# Patient Record
Sex: FEMALE | Race: WHITE | NOT HISPANIC OR LATINO | Employment: OTHER | URBAN - METROPOLITAN AREA
[De-identification: names, ages, dates, MRNs, and addresses within clinical notes are randomized per-mention and may not be internally consistent; named-entity substitution may affect disease eponyms.]

---

## 2019-11-26 ENCOUNTER — HOSPITAL ENCOUNTER (OUTPATIENT)
Dept: RADIOLOGY | Facility: MEDICAL CENTER | Age: 64
End: 2019-11-26

## 2019-11-26 DIAGNOSIS — I72.9 ANEURYSM (HCC): Primary | ICD-10-CM

## 2019-11-27 NOTE — PROGRESS NOTES
Neuro Interventional Service Consultation      Re: Tamika Appiah     MRN: [id number]   : 1955    Tamika Appiah was referred to our service by Andrew Tatum DO. She is a 64 y.o. female seen in clinic for evaluation and possible aneurysm intervention. She is also under the care of Nai Ocasio MD and Ad Bhat MD.    History of Present Illness:   has a history of dementia and underwent a workup at [facility] for frontotemporal dementia, which has proven negative. Imaging revealed an incidental, unruptured 5-6 mm anterior communicating artery aneurysm. She has been referred to the Neuro Interventional Service for evaluation and management of this finding. Retrospective MRI imaging provided by the patient's  from [facility] shows this aneurysm was present in  but was approximately 1 mm smaller in size.    She is seen today for review of imaging studies and discussion of possible aneurysm intervention. Today, the patient reports her chief complaint is ongoing memory problems. She denies headaches. There is no family history of intracranial aneurysm but her mother had a dissecting aortic aneurysm. Blood pressure is controlled. She does not smoke and has not used recreational drugs in 30 years. She denies any problems with bleeding, including gross hematuria, hematemesis, vaginal bleeding, and melena. She used to get nosebleeds but had cautery and has not had any recent problems. She denies any cardiopulmonary history and denies any past problems with anesthesia. Her  accompanied her and helpfully provided her history and several years of medical records for review.    Past Medical History:   Diagnosis Date   • Dementia 2019     No past surgical history on file.  Social History     Socioeconomic History   • Marital status:      Spouse name: Not on file   • Number of children: Not on file   • Years of education: Not on file   • Highest education level: Not on file   Occupational History   • Not on  file   Social Needs   • Financial resource strain: Not on file   • Food insecurity:     Worry: Not on file     Inability: Not on file   • Transportation needs:     Medical: Not on file     Non-medical: Not on file   Tobacco Use   • Smoking status: Never Smoker   • Smokeless tobacco: Never Used   Substance and Sexual Activity   • Alcohol use: Yes     Comment: rarely   • Drug use: Yes     Comment: marijuana    • Sexual activity: Not on file   Lifestyle   • Physical activity:     Days per week: Not on file     Minutes per session: Not on file   • Stress: Not on file   Relationships   • Social connections:     Talks on phone: Not on file     Gets together: Not on file     Attends L[employer] day service: Not on file     Active member of club or organization: Not on file     Attends meetings of clubs or organizations: Not on file     Relationship status: Not on file   • Intimate partner violence:     Fear of current or ex partner: Not on file     Emotionally abused: Not on file     Physically abused: Not on file     Forced sexual activity: Not on file   Other Topics Concern   • Not on file   Social History Narrative   • Not on file     No family history on file.    Review of Systems   Constitutional: Negative.  Negative for chills, diaphoresis, fever, malaise/fatigue and weight loss.   HENT: Positive for hearing loss. Negative for nosebleeds.    Respiratory: Negative.    Cardiovascular: Negative.    Gastrointestinal: Negative for blood in stool, heartburn and melena.   Genitourinary: Negative for hematuria.   Skin: Negative.    Neurological: Negative for sensory change, speech change, focal weakness, weakness and headaches.   Endo/Heme/Allergies: Does not bruise/bleed easily.   Psychiatric/Behavioral: Positive for memory loss. Negative for substance abuse. The patient is nervous/anxious.      A comprehensive 14-point review of systems was negative except as described above.     Labs:   None    Radiology:   CTA head on October  18, 2019 at [facility]:  1. 4.5 x 5.5 mm aneurysm arising from the anterior communicating artery. Consultation with neurointerventional radiology or neurosurgery is recommended.   2. Stenoses reported are derived by comparing the narrowest segment with the more distal luminal diameter.       MRI May 18, 2015 from [facility]:    Current Outpatient Medications   Medication Sig Dispense Refill   • quetiapine (SEROQUEL) 200 MG Tab Take 200 mg by mouth 2 times a day.     • topiramate (TOPAMAX) 100 MG Tab Take 100 mg by mouth 2 times a day.     • simvastatin (ZOCOR) 10 MG Tab Take 10 mg by mouth every evening.     • non-formulary med Indications: anti depressant, pt unsure of the name     • cyclobenzaprine (FLEXERIL) 10 MG Tab [id number] times a day as needed for spasm 30 Tab 0   • naproxen (NAPROSYN) 500 MG Tab Take 1 Tab by mouth 2 times a day, with meals. 20 Tab 3   • naproxen (NAPROSYN) 500 MG TABS Take 1 Tab by mouth 2 times a day, with meals. 20 Tab 3   • alprazolam (XANAX) 1 MG TABS Take 1 Tab by mouth at bedtime as needed for Sleep. 60 Each 0     No current facility-administered medications for this visit.        No Known Allergies    Physical Exam   Constitutional: She is oriented to person, place, and time and well-developed, well-nourished, and in no distress. No distress.   HENT:   Head: Normocephalic.   Eyes: No scleral icterus.   Pulmonary/Chest: Effort normal. No respiratory distress.   Abdominal: She exhibits no distension.   Neurological: She is alert and oriented to person, place, and time. She has normal sensation and normal strength. She is not agitated and not disoriented. She displays no weakness, no tremor, facial symmetry, normal stance and normal speech. No cranial nerve deficit. Gait normal. Coordination and gait normal.   Skin: Skin is warm and dry. No rash noted. She is not diaphoretic. No erythema. No pallor.   Psychiatric: Affect and judgment normal. Her mood appears anxious. She exhibits  abnormal new learning ability, abnormal recent memory and abnormal remote memory.     Impression:   1. Unruptured anterior communicating artery aneurysm 5-6 mm in size, amenable to endovascular intervention.  2. Dementia.    Plan:   Efrain Dudley MD has reviewed 's history and imaging studies, examined the patient, and discussed treatment options.  is a candidate for endovascular intervention of this incidental asymptomatic aneurysm. We explained that this aneurysm is not contributing to her memory loss and treatment of the aneurysm will not improve her memory and that the goal of intervention is to prevent future rupture. Aneurysms of this size and in this location carry a cumulative 5 year rupture risk of 2-30 %; overall procedure risk is approximately 1-3%. After review of the MRI from 2015, the aneurysm appears to have increased slightly in size. Therefore our recommendation is intervention over surveillance. Our first recommendation is embolization with the Web Device, and if the aneurysm is not amenable to this, then proceed with stent assisted coil embolization.    We discussed the method of the procedure at length including the possibility of the use of stents or balloons to facilitate the procedure and associated risks of those devices. We additionally discussed the procedure risks, including bleeding and infection, damage to the arteries, reaction to any medications given during the procedure, side effects of contrast, radiation exposure, in stent stenosis, coil or stent migration, mechanical failure, stroke, hemorrhage, and death. There is a chance the aneurysm may ultimately not be amenable to endovascular intervention. After the procedure, there is a chance that the aneurysm could recur. We discussed alternatives of the procedure including surveillance and surgical intervention, which she has already discussed with her neurosurgeon. The patient verbalizes understanding of risks,  benefits, and alternatives and elects to proceed. We discussed the need for aspirin and Plavix prior to the procedure and for up to 6 months post procedure if a stent is placed. Side effects of antiplatelet therapy, specifically bleeding, were discussed with instructions given should the patient develop minor or major bleeding. Printed aneurysm education materials including device information were provided. Written pre- and post- procedure care instructions were provided. We discussed signs of a sentinel headache and stroke with instructions to call an ambulance for the onset of these symptoms. We will submit images for review and plan Web placement in January if the device is approved.     JUANITO Jacobsen with Efrain Dudley MD  Neuro Interventional Service   [facility]   [id number]  [place] [address]  [phone]

## 2019-12-05 ENCOUNTER — HOSPITAL ENCOUNTER (OUTPATIENT)
Dept: RADIOLOGY | Facility: MEDICAL CENTER | Age: 64
End: 2019-12-05

## 2019-12-05 ENCOUNTER — HOSPITAL ENCOUNTER (OUTPATIENT)
Dept: RADIOLOGY | Facility: MEDICAL CENTER | Age: 64
End: 2019-12-05
Attending: RADIOLOGY

## 2019-12-05 DIAGNOSIS — I72.9 ANEURYSM (HCC): ICD-10-CM

## 2019-12-05 ASSESSMENT — ENCOUNTER SYMPTOMS
HEARTBURN: 0
HEADACHES: 0
MEMORY LOSS: 1
SENSORY CHANGE: 0
CARDIOVASCULAR NEGATIVE: 1
CONSTITUTIONAL NEGATIVE: 1
NERVOUS/ANXIOUS: 1
BLOOD IN STOOL: 0
WEIGHT LOSS: 0
FOCAL WEAKNESS: 0
RESPIRATORY NEGATIVE: 1
DIAPHORESIS: 0
CHILLS: 0
FEVER: 0
BRUISES/BLEEDS EASILY: 0
WEAKNESS: 0
SPEECH CHANGE: 0

## 2019-12-05 ASSESSMENT — LIFESTYLE VARIABLES: SUBSTANCE_ABUSE: 0

## 2019-12-12 DIAGNOSIS — I72.9 ANEURYSM (HCC): Primary | ICD-10-CM

## 2019-12-12 RX ORDER — SODIUM CHLORIDE 9 MG/ML
INJECTION, SOLUTION INTRAVENOUS CONTINUOUS
Status: CANCELLED | OUTPATIENT
Start: 2019-12-18

## 2019-12-12 NOTE — PROGRESS NOTES
Upon further review of imaging, Dr. Curran determined that additional DSA images were needed to plan for procedure. He spoke with pt/ spouse to explain and they agree to a diagnostic angiogram, which has been scheduled for 12/18.

## 2019-12-17 RX ORDER — SERTRALINE HYDROCHLORIDE 100 MG/1
150 TABLET, FILM COATED ORAL EVERY MORNING
COMMUNITY

## 2019-12-17 RX ORDER — QUETIAPINE 150 MG/1
300 TABLET, FILM COATED, EXTENDED RELEASE ORAL
Status: ON HOLD | COMMUNITY
End: 2020-01-27

## 2019-12-17 RX ORDER — MULTIVITAMIN WITH IRON
250 TABLET ORAL DAILY
COMMUNITY

## 2019-12-17 RX ORDER — ATORVASTATIN CALCIUM 40 MG/1
40 TABLET, FILM COATED ORAL NIGHTLY
COMMUNITY

## 2019-12-17 RX ORDER — ALPRAZOLAM 1 MG/1
1 TABLET ORAL 3 TIMES DAILY PRN
COMMUNITY
End: 2021-05-10

## 2019-12-17 RX ORDER — ROPINIROLE 0.25 MG/1
0.25 TABLET, FILM COATED ORAL
COMMUNITY

## 2019-12-17 RX ORDER — CYANOCOBALAMIN (VITAMIN B-12) 5000 MCG
5000 TABLET,DISINTEGRATING ORAL DAILY
COMMUNITY

## 2019-12-18 ENCOUNTER — APPOINTMENT (OUTPATIENT)
Dept: RADIOLOGY | Facility: MEDICAL CENTER | Age: 64
End: 2019-12-18
Attending: RADIOLOGY
Payer: MEDICARE

## 2019-12-18 ENCOUNTER — HOSPITAL ENCOUNTER (OUTPATIENT)
Facility: MEDICAL CENTER | Age: 64
End: 2019-12-18
Attending: RADIOLOGY | Admitting: RADIOLOGY
Payer: MEDICARE

## 2019-12-18 VITALS
OXYGEN SATURATION: 100 % | BODY MASS INDEX: 20.41 KG/M2 | HEART RATE: 66 BPM | RESPIRATION RATE: 20 BRPM | TEMPERATURE: 97.5 F | SYSTOLIC BLOOD PRESSURE: 108 MMHG | HEIGHT: 68 IN | DIASTOLIC BLOOD PRESSURE: 54 MMHG | WEIGHT: 134.7 LBS

## 2019-12-18 DIAGNOSIS — I72.9 ANEURYSM (HCC): ICD-10-CM

## 2019-12-18 LAB
CREAT SERPL-MCNC: 0.88 MG/DL (ref 0.5–1.4)
ERYTHROCYTE [DISTWIDTH] IN BLOOD BY AUTOMATED COUNT: 45.2 FL (ref 35.9–50)
HCT VFR BLD AUTO: 43.9 % (ref 37–47)
HGB BLD-MCNC: 14.2 G/DL (ref 12–16)
INR PPP: 0.93 (ref 0.87–1.13)
MCH RBC QN AUTO: 30.5 PG (ref 27–33)
MCHC RBC AUTO-ENTMCNC: 32.3 G/DL (ref 33.6–35)
MCV RBC AUTO: 94.2 FL (ref 81.4–97.8)
PLATELET # BLD AUTO: 208 K/UL (ref 164–446)
PMV BLD AUTO: 9.6 FL (ref 9–12.9)
PROTHROMBIN TIME: 12.6 SEC (ref 12–14.6)
RBC # BLD AUTO: 4.66 M/UL (ref 4.2–5.4)
WBC # BLD AUTO: 7.3 K/UL (ref 4.8–10.8)

## 2019-12-18 PROCEDURE — 700111 HCHG RX REV CODE 636 W/ 250 OVERRIDE (IP): Performed by: RADIOLOGY

## 2019-12-18 PROCEDURE — 82565 ASSAY OF CREATININE: CPT

## 2019-12-18 PROCEDURE — 99153 MOD SED SAME PHYS/QHP EA: CPT

## 2019-12-18 PROCEDURE — 700117 HCHG RX CONTRAST REV CODE 255: Performed by: RADIOLOGY

## 2019-12-18 PROCEDURE — 85027 COMPLETE CBC AUTOMATED: CPT

## 2019-12-18 PROCEDURE — 85610 PROTHROMBIN TIME: CPT

## 2019-12-18 PROCEDURE — 700111 HCHG RX REV CODE 636 W/ 250 OVERRIDE (IP)

## 2019-12-18 PROCEDURE — A9270 NON-COVERED ITEM OR SERVICE: HCPCS

## 2019-12-18 PROCEDURE — 700105 HCHG RX REV CODE 258: Performed by: NURSE PRACTITIONER

## 2019-12-18 PROCEDURE — 160002 HCHG RECOVERY MINUTES (STAT)

## 2019-12-18 PROCEDURE — 700102 HCHG RX REV CODE 250 W/ 637 OVERRIDE(OP)

## 2019-12-18 RX ORDER — ONDANSETRON 2 MG/ML
4 INJECTION INTRAMUSCULAR; INTRAVENOUS PRN
Status: DISCONTINUED | OUTPATIENT
Start: 2019-12-18 | End: 2019-12-18 | Stop reason: HOSPADM

## 2019-12-18 RX ORDER — OXYCODONE HYDROCHLORIDE 5 MG/1
TABLET ORAL
Status: COMPLETED
Start: 2019-12-18 | End: 2019-12-18

## 2019-12-18 RX ORDER — SODIUM CHLORIDE 9 MG/ML
INJECTION, SOLUTION INTRAVENOUS CONTINUOUS
Status: DISCONTINUED | OUTPATIENT
Start: 2019-12-18 | End: 2019-12-18 | Stop reason: HOSPADM

## 2019-12-18 RX ORDER — HYDROMORPHONE HYDROCHLORIDE 2 MG/ML
0.25 INJECTION, SOLUTION INTRAMUSCULAR; INTRAVENOUS; SUBCUTANEOUS
Status: DISCONTINUED | OUTPATIENT
Start: 2019-12-18 | End: 2019-12-18 | Stop reason: HOSPADM

## 2019-12-18 RX ORDER — MIDAZOLAM HYDROCHLORIDE 1 MG/ML
.5-2 INJECTION INTRAMUSCULAR; INTRAVENOUS PRN
Status: DISCONTINUED | OUTPATIENT
Start: 2019-12-18 | End: 2019-12-18 | Stop reason: HOSPADM

## 2019-12-18 RX ORDER — OXYCODONE HYDROCHLORIDE 5 MG/1
5 TABLET ORAL
Status: DISCONTINUED | OUTPATIENT
Start: 2019-12-18 | End: 2019-12-18 | Stop reason: HOSPADM

## 2019-12-18 RX ORDER — MIDAZOLAM HYDROCHLORIDE 1 MG/ML
INJECTION INTRAMUSCULAR; INTRAVENOUS
Status: COMPLETED
Start: 2019-12-18 | End: 2019-12-18

## 2019-12-18 RX ORDER — SODIUM CHLORIDE 9 MG/ML
500 INJECTION, SOLUTION INTRAVENOUS
Status: DISCONTINUED | OUTPATIENT
Start: 2019-12-18 | End: 2019-12-18 | Stop reason: HOSPADM

## 2019-12-18 RX ORDER — OXYCODONE HYDROCHLORIDE 5 MG/1
2.5 TABLET ORAL
Status: DISCONTINUED | OUTPATIENT
Start: 2019-12-18 | End: 2019-12-18 | Stop reason: HOSPADM

## 2019-12-18 RX ADMIN — MIDAZOLAM 1 MG: 1 INJECTION INTRAMUSCULAR; INTRAVENOUS at 09:42

## 2019-12-18 RX ADMIN — FENTANYL CITRATE 25 MCG: 50 INJECTION, SOLUTION INTRAMUSCULAR; INTRAVENOUS at 09:39

## 2019-12-18 RX ADMIN — FENTANYL CITRATE 25 MCG: 50 INJECTION, SOLUTION INTRAMUSCULAR; INTRAVENOUS at 09:54

## 2019-12-18 RX ADMIN — FENTANYL CITRATE 25 MCG: 50 INJECTION, SOLUTION INTRAMUSCULAR; INTRAVENOUS at 09:42

## 2019-12-18 RX ADMIN — SODIUM CHLORIDE: 9 INJECTION, SOLUTION INTRAVENOUS at 07:02

## 2019-12-18 RX ADMIN — MIDAZOLAM 1 MG: 1 INJECTION INTRAMUSCULAR; INTRAVENOUS at 09:54

## 2019-12-18 RX ADMIN — OXYCODONE HYDROCHLORIDE 10 MG: 5 TABLET ORAL at 11:47

## 2019-12-18 RX ADMIN — FENTANYL CITRATE 25 MCG: 0.05 INJECTION, SOLUTION INTRAMUSCULAR; INTRAVENOUS at 09:39

## 2019-12-18 RX ADMIN — IOHEXOL 107 ML: 300 INJECTION, SOLUTION INTRAVENOUS at 09:45

## 2019-12-18 RX ADMIN — MIDAZOLAM HYDROCHLORIDE 1 MG: 1 INJECTION, SOLUTION INTRAMUSCULAR; INTRAVENOUS at 09:39

## 2019-12-18 RX ADMIN — FENTANYL CITRATE 25 MCG: 50 INJECTION, SOLUTION INTRAMUSCULAR; INTRAVENOUS at 09:46

## 2019-12-18 RX ADMIN — MIDAZOLAM 1 MG: 1 INJECTION INTRAMUSCULAR; INTRAVENOUS at 09:39

## 2019-12-18 RX ADMIN — MIDAZOLAM 1 MG: 1 INJECTION INTRAMUSCULAR; INTRAVENOUS at 09:46

## 2019-12-18 NOTE — DISCHARGE INSTRUCTIONS
ACTIVITY: Rest and take it easy for the first 24 hours.  A responsible adult is recommended to remain with you during that time.  It is normal to feel sleepy.  We encourage you to not do anything that requires balance, judgment or coordination.    MILD FLU-LIKE SYMPTOMS ARE NORMAL. YOU MAY EXPERIENCE GENERALIZED MUSCLE ACHES, THROAT IRRITATION, HEADACHE AND/OR SOME NAUSEA.    FOR 24 HOURS DO NOT:  Drive, operate machinery or run household appliances.  Drink beer or alcoholic beverages.   Make important decisions or sign legal documents.        DIET: To avoid nausea, slowly advance diet as tolerated, avoiding spicy or greasy foods for the first day.  Add more substantial food to your diet according to your physician's instructions.  Babies can be fed formula or breast milk as soon as they are hungry.  INCREASE FLUIDS AND FIBER TO AVOID CONSTIPATION.    SURGICAL DRESSING/BATHING:     Keep the dressing clean and dry for 24 hours.  May remove dressing tomorrow  and can shower.  Do not submerge site under water for 1 week.  No heavy lifting and limit movement for 5 days.      FOLLOW-UP APPOINTMENT:  A follow-up appointment should be arranged with your doctor; call to schedule.    You should CALL YOUR PHYSICIAN if you develop:  Fever greater than 101 degrees F.  Pain not relieved by medication, or persistent nausea or vomiting.  Excessive bleeding (blood soaking through dressing) or unexpected drainage from the wound.  Extreme redness or swelling around the incision site, drainage of pus or foul smelling drainage.  Inability to urinate or empty your bladder within 8 hours.  Problems with breathing or chest pain.    You should call 911 if you develop problems with breathing or chest pain.  If you are unable to contact your doctor or surgical center, you should go to the nearest emergency room or urgent care center.      Physician's telephone #: [phone]    If any questions arise, call your doctor.  If your doctor is not  available, please feel free to call the Surgical Center at [phone].  The Center is open Monday through Friday from 7AM to 7PM.  You can also call the HEALTH HOTLINE open 24 hours/day, 7 days/week and speak to a nurse at [phone], or toll free at [phone].    A registered nurse may call you a few days after your surgery to see how you are doing after your procedure.    MEDICATIONS: Resume taking daily medication.  Take prescribed pain medication with food.  If no medication is prescribed, you may take non-aspirin pain medication if needed.  PAIN MEDICATION CAN BE VERY CONSTIPATING.  Take a stool softener or laxative such as senokot, pericolace, or milk of magnesia if needed.      If your physician has prescribed pain medication that includes Acetaminophen (Tylenol), do not take additional Acetaminophen (Tylenol) while taking the prescribed medication.    Depression / Suicide Risk    As you are discharged from this [facility] facility, it is important to learn how to keep safe from harming yourself.    Recognize the warning signs:  · Abrupt changes in personality, positive or negative- including increase in energy   · Giving away possessions  · Change in eating patterns- significant weight changes-  positive or negative  · Change in sleeping patterns- unable to sleep or sleeping all the time   · Unwillingness or inability to communicate  · Depression  · Unusual sadness, discouragement and loneliness  · Talk of wanting to die  · Neglect of personal appearance   · Rebelliousness- reckless behavior  · Withdrawal from people/activities they love  · Confusion- inability to concentrate     If you or a loved one observes any of these behaviors or has concerns about self-harm, here's what you can do:  · Talk about it- your feelings and reasons for harming yourself  · Remove any means that you might use to hurt yourself (examples: pills, rope, extension cords, firearm)  · Get professional help from the  "community (Mental Health, Substance Abuse, psychological counseling)  · Do not be alone:Call your Safe Contact- someone whom you trust who will be there for you.  · Call your local CRISIS HOTLINE 816-4703 or 713-197-5209  · Call your local Children's Mobile Crisis Response Team [place] (007) 713-7141 or www.[employer]  · Call the toll free National Suicide Prevention Hotlines   · National Suicide Prevention Lifeline 250-511-OKRV (4906)  · National Hope Line Network 800-SUICIDE (329-3177)                              Post Angiogram Groin Care Instructions     INSTRUCTIONS  2. Examine (look and feel) the site of your incision site TODAY so you can recognize changes that should be called to your doctor (see below).  3. Avoid straining either by lifting or pulling objects for 4-5 days. Avoid lifting over 5 pounds.   4. For at least 72 hours, if you should sneeze or cough, please hold pressure over your groin area.  5. If you should begin to have oozing from the catheterization site, please hold firm pressure and call your doctor's office immediately.  6. If profuse bleeding occurs from the catheterization site, hold firm pressure and call \"[phone]\" immediately for assistance.  7. Remove bandage after 24 hours.     ACTIVITY  2. Limit activity as instructed by your doctor.  3. No driving or very limited driving with frequent stops for one week.   4. If you must take a long car ride, stop every hour and walk around the car.   5. Warm showers or baths are permitted after the bandage is removed. Avoid hot showers, baths, hot tubs, and swimming for one week.    PLEASE CALL YOUR DOCTOR IF:  1. Temperature elevation occurs.  2. Catheterization site becomes reddened or begins to drain.   3. Bruising appears to be new or not resolving. The bruise may move down your leg. This is normal.  4. The small round lump in the groin increases in size.  5. Any leg numbness, aching, or discomfort (immediately).  6. Increasing " discomfort in the leg at the insertion site.  7. Chest pains, even if relieved by Nitroglycerin.    MISCELLANEOUS INSTRUCTIONS  1. Bruising may occur as a result of heart catheterization. Some of the discoloration may travel down the leg, going from blue to green in color.  2. A small round lump under the catheterization site will remain for up to six weeks.  3. If any questions arise call your physician's office. You can also call the [employer] HOTLINE open 24 hours/day, 7 days/week and speak to a nurse at [phone], or toll free at [phone].   4. You should call 911 if you develop problems with breathing or chest pain.      I acknowledge receipt and understanding of these Home Care instructions.

## 2019-12-18 NOTE — PROGRESS NOTES
IR Nursing Note      Procedure Confirmed with MD, RN, RT and patient pre procedure.  Cerebral Angiogram by MD Dudley assisted by RT Raissa, Right Femoral Artery access site.    Sedation Start Time: 0939  Ptime Out/Procedure Start Time: 0940  Procedure Stop Time: 1010  Sedation End Time: 1022    End Tidal CO2 range 20-32 throughout procedure.    Right Femoral Artery access site, sealed with angioseal, covered with gauze/tegaderm, C/D/I.   AngioSeal VIP Vascular Closure Device, REF# [id number], LOT# [id number], exp. Date 5/31/2020.    1+ Bilateral DP pulses pre procedure   1+ Bilateral DP pulses post procedure     Patient tolerated procedure,  hemodynamically stable; pt drowsy but talking post procedure; report given to ANN Weaver.  Patient transported to PPU pod #1 via IR RN monitored then transferred care to report RN.

## 2019-12-18 NOTE — OR SURGEON
Immediate Post- Operative Note        PostOp Diagnosis: aneurym      Procedure(s): diagnostic cerebral angiogram      Estimated Blood Loss: Less than 5 ml        Complications: None            12/18/2019     11:17 AM     Efrain Dudley

## 2019-12-18 NOTE — OR NURSING
1030: Patient from cath lab to PPU via gurney. Patient is awake and on bedrest/flat X 2 hours. VSS. RLE CMS intact. R groin site soft and dressing clean, dry and intact. Vascular access care management per MD order (see assessment). No c/o pain or nausea at this time. Will monitor closely. Vital signs per MD order.   1100: VSS. R groin intact.  at bedside.   1140: Dr. Perez at bedside to update patient and . Patient c/o pain and will medicate.    1215: Patient ambulated to and from the bathroom without a problem. R groin intact.   1220: DC instructions given. Questions answered. Family at bedside. R groin soft,CDI. No c/o pain or nausea. Patient wide awake. VSS. Patient met criteria for discharge.

## 2020-01-27 ENCOUNTER — ANESTHESIA (OUTPATIENT)
Dept: RADIOLOGY | Facility: MEDICAL CENTER | Age: 65
DRG: 027 | End: 2020-01-27
Payer: MEDICARE

## 2020-01-27 ENCOUNTER — HOSPITAL ENCOUNTER (INPATIENT)
Facility: MEDICAL CENTER | Age: 65
LOS: 1 days | DRG: 027 | End: 2020-01-28
Attending: RADIOLOGY | Admitting: RADIOLOGY
Payer: MEDICARE

## 2020-01-27 ENCOUNTER — ANESTHESIA EVENT (OUTPATIENT)
Dept: RADIOLOGY | Facility: MEDICAL CENTER | Age: 65
DRG: 027 | End: 2020-01-27
Payer: MEDICARE

## 2020-01-27 ENCOUNTER — APPOINTMENT (OUTPATIENT)
Dept: RADIOLOGY | Facility: MEDICAL CENTER | Age: 65
DRG: 027 | End: 2020-01-27
Attending: RADIOLOGY
Payer: MEDICARE

## 2020-01-27 DIAGNOSIS — I67.1 INTRACRANIAL ANEURYSM: ICD-10-CM

## 2020-01-27 PROBLEM — N18.30 CHRONIC KIDNEY DISEASE (CKD), STAGE III (MODERATE): Status: ACTIVE | Noted: 2020-01-27

## 2020-01-27 PROBLEM — E78.5 HYPERLIPIDEMIA: Status: ACTIVE | Noted: 2020-01-27

## 2020-01-27 PROBLEM — Z79.899 CHRONIC PRESCRIPTION BENZODIAZEPINE USE: Status: ACTIVE | Noted: 2020-01-27

## 2020-01-27 PROBLEM — B19.20 HEPATITIS C: Status: ACTIVE | Noted: 2020-01-27

## 2020-01-27 PROBLEM — G25.81 RESTLESS LEG SYNDROME: Status: ACTIVE | Noted: 2020-01-27

## 2020-01-27 PROBLEM — F31.9 MANIC DEPRESSIVE ILLNESS (HCC): Status: ACTIVE | Noted: 2020-01-27

## 2020-01-27 PROBLEM — D72.819 LEUKOPENIA: Status: ACTIVE | Noted: 2020-01-27

## 2020-01-27 PROBLEM — F41.9 ANXIETY: Status: ACTIVE | Noted: 2020-01-27

## 2020-01-27 LAB
ACT BLD: 208 SEC (ref 74–137)
ANION GAP SERPL CALC-SCNC: 8 MMOL/L (ref 0–11.9)
BUN SERPL-MCNC: 19 MG/DL (ref 8–22)
CALCIUM SERPL-MCNC: 9.1 MG/DL (ref 8.5–10.5)
CHLORIDE SERPL-SCNC: 112 MMOL/L (ref 96–112)
CO2 SERPL-SCNC: 21 MMOL/L (ref 20–33)
CREAT SERPL-MCNC: 1.11 MG/DL (ref 0.5–1.4)
EKG IMPRESSION: NORMAL
ERYTHROCYTE [DISTWIDTH] IN BLOOD BY AUTOMATED COUNT: 45.7 FL (ref 35.9–50)
GLUCOSE SERPL-MCNC: 97 MG/DL (ref 65–99)
HCT VFR BLD AUTO: 41.4 % (ref 37–47)
HGB BLD-MCNC: 13.7 G/DL (ref 12–16)
INR PPP: 0.93 (ref 0.87–1.13)
MCH RBC QN AUTO: 31 PG (ref 27–33)
MCHC RBC AUTO-ENTMCNC: 33.1 G/DL (ref 33.6–35)
MCV RBC AUTO: 93.7 FL (ref 81.4–97.8)
PLATELET # BLD AUTO: 198 K/UL (ref 164–446)
PMV BLD AUTO: 9.8 FL (ref 9–12.9)
POTASSIUM SERPL-SCNC: 4.3 MMOL/L (ref 3.6–5.5)
PROTHROMBIN TIME: 12.7 SEC (ref 12–14.6)
RBC # BLD AUTO: 4.42 M/UL (ref 4.2–5.4)
SODIUM SERPL-SCNC: 141 MMOL/L (ref 135–145)
WBC # BLD AUTO: 4.3 K/UL (ref 4.8–10.8)

## 2020-01-27 PROCEDURE — 61624 TCAT PERM OCCLS/EMBOLJ CNS: CPT

## 2020-01-27 PROCEDURE — 700111 HCHG RX REV CODE 636 W/ 250 OVERRIDE (IP)

## 2020-01-27 PROCEDURE — 700102 HCHG RX REV CODE 250 W/ 637 OVERRIDE(OP): Performed by: HOSPITALIST

## 2020-01-27 PROCEDURE — 700111 HCHG RX REV CODE 636 W/ 250 OVERRIDE (IP): Performed by: ANESTHESIOLOGY

## 2020-01-27 PROCEDURE — 700117 HCHG RX CONTRAST REV CODE 255: Performed by: RADIOLOGY

## 2020-01-27 PROCEDURE — 85027 COMPLETE CBC AUTOMATED: CPT

## 2020-01-27 PROCEDURE — A9270 NON-COVERED ITEM OR SERVICE: HCPCS | Performed by: HOSPITALIST

## 2020-01-27 PROCEDURE — 700111 HCHG RX REV CODE 636 W/ 250 OVERRIDE (IP): Mod: JG | Performed by: RADIOLOGY

## 2020-01-27 PROCEDURE — 36217 PLACE CATHETER IN ARTERY: CPT

## 2020-01-27 PROCEDURE — 700102 HCHG RX REV CODE 250 W/ 637 OVERRIDE(OP): Performed by: INTERNAL MEDICINE

## 2020-01-27 PROCEDURE — 03VG3DZ RESTRICTION OF INTRACRANIAL ARTERY WITH INTRALUMINAL DEVICE, PERCUTANEOUS APPROACH: ICD-10-PCS | Performed by: RADIOLOGY

## 2020-01-27 PROCEDURE — 80048 BASIC METABOLIC PNL TOTAL CA: CPT

## 2020-01-27 PROCEDURE — A9270 NON-COVERED ITEM OR SERVICE: HCPCS | Performed by: INTERNAL MEDICINE

## 2020-01-27 PROCEDURE — 93005 ELECTROCARDIOGRAM TRACING: CPT | Performed by: RADIOLOGY

## 2020-01-27 PROCEDURE — 700105 HCHG RX REV CODE 258: Performed by: RADIOLOGY

## 2020-01-27 PROCEDURE — 85610 PROTHROMBIN TIME: CPT

## 2020-01-27 PROCEDURE — 99223 1ST HOSP IP/OBS HIGH 75: CPT | Performed by: INTERNAL MEDICINE

## 2020-01-27 PROCEDURE — 770022 HCHG ROOM/CARE - ICU (200)

## 2020-01-27 PROCEDURE — 99223 1ST HOSP IP/OBS HIGH 75: CPT | Performed by: HOSPITALIST

## 2020-01-27 PROCEDURE — 160002 HCHG RECOVERY MINUTES (STAT)

## 2020-01-27 PROCEDURE — B31R1ZZ FLUOROSCOPY OF INTRACRANIAL ARTERIES USING LOW OSMOLAR CONTRAST: ICD-10-PCS | Performed by: RADIOLOGY

## 2020-01-27 PROCEDURE — A9270 NON-COVERED ITEM OR SERVICE: HCPCS | Performed by: RADIOLOGY

## 2020-01-27 PROCEDURE — 700105 HCHG RX REV CODE 258: Performed by: HOSPITALIST

## 2020-01-27 PROCEDURE — 700105 HCHG RX REV CODE 258: Performed by: ANESTHESIOLOGY

## 2020-01-27 PROCEDURE — 700101 HCHG RX REV CODE 250: Performed by: ANESTHESIOLOGY

## 2020-01-27 PROCEDURE — 93010 ELECTROCARDIOGRAM REPORT: CPT | Performed by: INTERNAL MEDICINE

## 2020-01-27 PROCEDURE — 700102 HCHG RX REV CODE 250 W/ 637 OVERRIDE(OP): Performed by: RADIOLOGY

## 2020-01-27 PROCEDURE — 85347 COAGULATION TIME ACTIVATED: CPT

## 2020-01-27 RX ORDER — SODIUM CHLORIDE, SODIUM LACTATE, POTASSIUM CHLORIDE, CALCIUM CHLORIDE 600; 310; 30; 20 MG/100ML; MG/100ML; MG/100ML; MG/100ML
INJECTION, SOLUTION INTRAVENOUS
Status: DISCONTINUED | OUTPATIENT
Start: 2020-01-27 | End: 2020-01-27 | Stop reason: SURG

## 2020-01-27 RX ORDER — LABETALOL HYDROCHLORIDE 5 MG/ML
5-10 INJECTION, SOLUTION INTRAVENOUS EVERY 6 HOURS PRN
Status: DISCONTINUED | OUTPATIENT
Start: 2020-01-27 | End: 2020-01-28 | Stop reason: HOSPADM

## 2020-01-27 RX ORDER — HYDROMORPHONE HYDROCHLORIDE 1 MG/ML
0.1 INJECTION, SOLUTION INTRAMUSCULAR; INTRAVENOUS; SUBCUTANEOUS
Status: DISCONTINUED | OUTPATIENT
Start: 2020-01-27 | End: 2020-01-27 | Stop reason: HOSPADM

## 2020-01-27 RX ORDER — QUETIAPINE FUMARATE 100 MG/1
300 TABLET, FILM COATED ORAL
Status: DISCONTINUED | OUTPATIENT
Start: 2020-01-27 | End: 2020-01-28 | Stop reason: HOSPADM

## 2020-01-27 RX ORDER — EPTIFIBATIDE 0.75 MG/ML
2 INJECTION, SOLUTION INTRAVENOUS CONTINUOUS
Status: DISPENSED | OUTPATIENT
Start: 2020-01-27 | End: 2020-01-27

## 2020-01-27 RX ORDER — ATORVASTATIN CALCIUM 40 MG/1
40 TABLET, FILM COATED ORAL NIGHTLY
Status: DISCONTINUED | OUTPATIENT
Start: 2020-01-27 | End: 2020-01-28 | Stop reason: HOSPADM

## 2020-01-27 RX ORDER — SODIUM CHLORIDE, SODIUM LACTATE, POTASSIUM CHLORIDE, AND CALCIUM CHLORIDE .6; .31; .03; .02 G/100ML; G/100ML; G/100ML; G/100ML
500 INJECTION, SOLUTION INTRAVENOUS
Status: DISCONTINUED | OUTPATIENT
Start: 2020-01-27 | End: 2020-01-27 | Stop reason: HOSPADM

## 2020-01-27 RX ORDER — ASPIRIN 300 MG/1
600 SUPPOSITORY RECTAL ONCE
Status: COMPLETED | OUTPATIENT
Start: 2020-01-27 | End: 2020-01-27

## 2020-01-27 RX ORDER — DEXMEDETOMIDINE HYDROCHLORIDE 100 UG/ML
INJECTION, SOLUTION INTRAVENOUS PRN
Status: DISCONTINUED | OUTPATIENT
Start: 2020-01-27 | End: 2020-01-27 | Stop reason: SURG

## 2020-01-27 RX ORDER — HALOPERIDOL 5 MG/ML
1 INJECTION INTRAMUSCULAR
Status: DISCONTINUED | OUTPATIENT
Start: 2020-01-27 | End: 2020-01-27 | Stop reason: HOSPADM

## 2020-01-27 RX ORDER — OXYCODONE HCL 5 MG/5 ML
5 SOLUTION, ORAL ORAL
Status: DISCONTINUED | OUTPATIENT
Start: 2020-01-27 | End: 2020-01-27 | Stop reason: HOSPADM

## 2020-01-27 RX ORDER — SODIUM CHLORIDE, SODIUM LACTATE, POTASSIUM CHLORIDE, CALCIUM CHLORIDE 600; 310; 30; 20 MG/100ML; MG/100ML; MG/100ML; MG/100ML
INJECTION, SOLUTION INTRAVENOUS CONTINUOUS
Status: ACTIVE | OUTPATIENT
Start: 2020-01-27 | End: 2020-01-27

## 2020-01-27 RX ORDER — VERAPAMIL HYDROCHLORIDE 2.5 MG/ML
20 INJECTION, SOLUTION INTRAVENOUS ONCE
Status: DISCONTINUED | OUTPATIENT
Start: 2020-01-27 | End: 2020-01-28 | Stop reason: HOSPADM

## 2020-01-27 RX ORDER — HEPARIN SODIUM,PORCINE 1000/ML
VIAL (ML) INJECTION PRN
Status: DISCONTINUED | OUTPATIENT
Start: 2020-01-27 | End: 2020-01-27 | Stop reason: SURG

## 2020-01-27 RX ORDER — SODIUM CHLORIDE 9 MG/ML
INJECTION, SOLUTION INTRAVENOUS CONTINUOUS
Status: DISCONTINUED | OUTPATIENT
Start: 2020-01-27 | End: 2020-01-28 | Stop reason: HOSPADM

## 2020-01-27 RX ORDER — VERAPAMIL HYDROCHLORIDE 2.5 MG/ML
INJECTION, SOLUTION INTRAVENOUS
Status: COMPLETED
Start: 2020-01-27 | End: 2020-01-27

## 2020-01-27 RX ORDER — LIDOCAINE HYDROCHLORIDE 10 MG/ML
INJECTION, SOLUTION EPIDURAL; INFILTRATION; INTRACAUDAL; PERINEURAL
Status: COMPLETED
Start: 2020-01-27 | End: 2020-01-27

## 2020-01-27 RX ORDER — HYDROMORPHONE HYDROCHLORIDE 1 MG/ML
0.2 INJECTION, SOLUTION INTRAMUSCULAR; INTRAVENOUS; SUBCUTANEOUS
Status: DISCONTINUED | OUTPATIENT
Start: 2020-01-27 | End: 2020-01-27 | Stop reason: HOSPADM

## 2020-01-27 RX ORDER — POLYETHYLENE GLYCOL 3350 17 G/17G
1 POWDER, FOR SOLUTION ORAL
Status: DISCONTINUED | OUTPATIENT
Start: 2020-01-27 | End: 2020-01-28 | Stop reason: HOSPADM

## 2020-01-27 RX ORDER — OXYCODONE HYDROCHLORIDE 5 MG/1
5 TABLET ORAL EVERY 4 HOURS PRN
Status: DISCONTINUED | OUTPATIENT
Start: 2020-01-27 | End: 2020-01-28 | Stop reason: HOSPADM

## 2020-01-27 RX ORDER — CEFAZOLIN SODIUM 1 G/3ML
INJECTION, POWDER, FOR SOLUTION INTRAMUSCULAR; INTRAVENOUS PRN
Status: DISCONTINUED | OUTPATIENT
Start: 2020-01-27 | End: 2020-01-27 | Stop reason: SURG

## 2020-01-27 RX ORDER — BISACODYL 10 MG
10 SUPPOSITORY, RECTAL RECTAL
Status: DISCONTINUED | OUTPATIENT
Start: 2020-01-27 | End: 2020-01-28 | Stop reason: HOSPADM

## 2020-01-27 RX ORDER — QUETIAPINE FUMARATE 300 MG/1
300 TABLET, FILM COATED ORAL
COMMUNITY

## 2020-01-27 RX ORDER — ACETAMINOPHEN 325 MG/1
650 TABLET ORAL EVERY 6 HOURS PRN
Status: DISCONTINUED | OUTPATIENT
Start: 2020-01-27 | End: 2020-01-28 | Stop reason: HOSPADM

## 2020-01-27 RX ORDER — MIDAZOLAM HYDROCHLORIDE 1 MG/ML
INJECTION INTRAMUSCULAR; INTRAVENOUS
Status: COMPLETED
Start: 2020-01-27 | End: 2020-01-27

## 2020-01-27 RX ORDER — AMOXICILLIN 250 MG
2 CAPSULE ORAL 2 TIMES DAILY
Status: DISCONTINUED | OUTPATIENT
Start: 2020-01-27 | End: 2020-01-28 | Stop reason: HOSPADM

## 2020-01-27 RX ORDER — HALOPERIDOL 5 MG/ML
INJECTION INTRAMUSCULAR
Status: COMPLETED
Start: 2020-01-27 | End: 2020-01-27

## 2020-01-27 RX ORDER — HEPARIN SODIUM,PORCINE 1000/ML
VIAL (ML) INJECTION
Status: COMPLETED
Start: 2020-01-27 | End: 2020-01-27

## 2020-01-27 RX ORDER — LORAZEPAM 2 MG/ML
0.5 INJECTION INTRAMUSCULAR
Status: DISCONTINUED | OUTPATIENT
Start: 2020-01-27 | End: 2020-01-27 | Stop reason: HOSPADM

## 2020-01-27 RX ORDER — ONDANSETRON 2 MG/ML
4 INJECTION INTRAMUSCULAR; INTRAVENOUS
Status: DISCONTINUED | OUTPATIENT
Start: 2020-01-27 | End: 2020-01-27 | Stop reason: HOSPADM

## 2020-01-27 RX ORDER — HYDRALAZINE HYDROCHLORIDE 20 MG/ML
10-20 INJECTION INTRAMUSCULAR; INTRAVENOUS EVERY 6 HOURS PRN
Status: DISCONTINUED | OUTPATIENT
Start: 2020-01-27 | End: 2020-01-28 | Stop reason: HOSPADM

## 2020-01-27 RX ORDER — ONDANSETRON 2 MG/ML
INJECTION INTRAMUSCULAR; INTRAVENOUS PRN
Status: DISCONTINUED | OUTPATIENT
Start: 2020-01-27 | End: 2020-01-27 | Stop reason: SURG

## 2020-01-27 RX ORDER — HALOPERIDOL 5 MG/ML
INJECTION INTRAMUSCULAR PRN
Status: DISCONTINUED | OUTPATIENT
Start: 2020-01-27 | End: 2020-01-27 | Stop reason: SURG

## 2020-01-27 RX ORDER — OXYCODONE HCL 5 MG/5 ML
10 SOLUTION, ORAL ORAL
Status: DISCONTINUED | OUTPATIENT
Start: 2020-01-27 | End: 2020-01-27 | Stop reason: HOSPADM

## 2020-01-27 RX ORDER — EPTIFIBATIDE 2 MG/ML
10 INJECTION, SOLUTION INTRAVENOUS ONCE
Status: DISCONTINUED | OUTPATIENT
Start: 2020-01-27 | End: 2020-01-27

## 2020-01-27 RX ORDER — DEXMEDETOMIDINE HYDROCHLORIDE 100 UG/ML
INJECTION, SOLUTION INTRAVENOUS
Status: COMPLETED
Start: 2020-01-27 | End: 2020-01-27

## 2020-01-27 RX ORDER — ALPRAZOLAM 0.5 MG/1
1 TABLET ORAL 3 TIMES DAILY PRN
Status: DISCONTINUED | OUTPATIENT
Start: 2020-01-27 | End: 2020-01-28 | Stop reason: HOSPADM

## 2020-01-27 RX ORDER — ROPINIROLE 0.25 MG/1
0.25 TABLET, FILM COATED ORAL
Status: DISCONTINUED | OUTPATIENT
Start: 2020-01-27 | End: 2020-01-28 | Stop reason: HOSPADM

## 2020-01-27 RX ADMIN — ROPINIROLE HYDROCHLORIDE 0.25 MG: 0.25 TABLET, FILM COATED ORAL at 20:08

## 2020-01-27 RX ADMIN — HEPARIN SODIUM 1000 UNITS: 1000 INJECTION, SOLUTION INTRAVENOUS; SUBCUTANEOUS at 09:59

## 2020-01-27 RX ADMIN — HALOPERIDOL LACTATE 1 MG: 5 INJECTION, SOLUTION INTRAMUSCULAR at 08:50

## 2020-01-27 RX ADMIN — PHENYLEPHRINE HYDROCHLORIDE 40 MCG/MIN: 10 INJECTION INTRAVENOUS at 08:47

## 2020-01-27 RX ADMIN — SENNOSIDES AND DOCUSATE SODIUM 2 TABLET: 8.6; 5 TABLET ORAL at 17:50

## 2020-01-27 RX ADMIN — OXYCODONE HYDROCHLORIDE 5 MG: 5 TABLET ORAL at 00:00

## 2020-01-27 RX ADMIN — SUFENTANIL CITRATE 25 MCG: 50 INJECTION EPIDURAL; INTRAVENOUS at 08:47

## 2020-01-27 RX ADMIN — LIDOCAINE HYDROCHLORIDE 5 ML: 10 INJECTION, SOLUTION EPIDURAL; INFILTRATION; INTRACAUDAL at 06:45

## 2020-01-27 RX ADMIN — EPTIFIBATIDE 10 MG: 2 INJECTION, SOLUTION INTRAVENOUS at 11:03

## 2020-01-27 RX ADMIN — LIDOCAINE HYDROCHLORIDE 60 MG: 20 INJECTION, SOLUTION INFILTRATION; PERINEURAL at 08:47

## 2020-01-27 RX ADMIN — CEFAZOLIN 2 G: 330 INJECTION, POWDER, FOR SOLUTION INTRAMUSCULAR; INTRAVENOUS at 09:35

## 2020-01-27 RX ADMIN — ROCURONIUM BROMIDE 70 MG: 10 INJECTION, SOLUTION INTRAVENOUS at 08:48

## 2020-01-27 RX ADMIN — ONDANSETRON 4 MG: 2 INJECTION INTRAMUSCULAR; INTRAVENOUS at 11:27

## 2020-01-27 RX ADMIN — TICAGRELOR 180 MG: 90 TABLET ORAL at 20:08

## 2020-01-27 RX ADMIN — PROPOFOL 120 MG: 10 INJECTION, EMULSION INTRAVENOUS at 08:47

## 2020-01-27 RX ADMIN — PROPOFOL 50 MCG/KG/MIN: 10 INJECTION, EMULSION INTRAVENOUS at 08:47

## 2020-01-27 RX ADMIN — QUETIAPINE FUMARATE 300 MG: 100 TABLET ORAL at 20:10

## 2020-01-27 RX ADMIN — ROCURONIUM BROMIDE 20 MG: 10 INJECTION, SOLUTION INTRAVENOUS at 11:09

## 2020-01-27 RX ADMIN — SODIUM CHLORIDE: 9 INJECTION, SOLUTION INTRAVENOUS at 17:50

## 2020-01-27 RX ADMIN — ATORVASTATIN CALCIUM 40 MG: 40 TABLET, FILM COATED ORAL at 20:10

## 2020-01-27 RX ADMIN — SODIUM CHLORIDE, POTASSIUM CHLORIDE, SODIUM LACTATE AND CALCIUM CHLORIDE: 600; 310; 30; 20 INJECTION, SOLUTION INTRAVENOUS at 08:41

## 2020-01-27 RX ADMIN — SUFENTANIL CITRATE 0.2 MCG/KG/HR: 50 INJECTION EPIDURAL; INTRAVENOUS at 08:47

## 2020-01-27 RX ADMIN — ASPIRIN 600 MG: 300 SUPPOSITORY RECTAL at 09:17

## 2020-01-27 RX ADMIN — DEXMEDETOMIDINE HYDROCHLORIDE 20 MCG: 100 INJECTION, SOLUTION INTRAVENOUS at 08:50

## 2020-01-27 RX ADMIN — EPTIFIBATIDE 2 MCG/KG/MIN: 75 INJECTION INTRAVENOUS at 20:47

## 2020-01-27 RX ADMIN — VERAPAMIL HYDROCHLORIDE 20 MG: 2.5 INJECTION, SOLUTION INTRAVENOUS at 09:56

## 2020-01-27 RX ADMIN — HEPARIN SODIUM 1000 UNITS: 1000 INJECTION, SOLUTION INTRAVENOUS; SUBCUTANEOUS at 11:02

## 2020-01-27 RX ADMIN — MIDAZOLAM HYDROCHLORIDE 2 MG: 1 INJECTION, SOLUTION INTRAMUSCULAR; INTRAVENOUS at 08:45

## 2020-01-27 RX ADMIN — SODIUM CHLORIDE, POTASSIUM CHLORIDE, SODIUM LACTATE AND CALCIUM CHLORIDE: 600; 310; 30; 20 INJECTION, SOLUTION INTRAVENOUS at 06:46

## 2020-01-27 RX ADMIN — SODIUM CHLORIDE, POTASSIUM CHLORIDE, SODIUM LACTATE AND CALCIUM CHLORIDE: 600; 310; 30; 20 INJECTION, SOLUTION INTRAVENOUS at 11:08

## 2020-01-27 RX ADMIN — HEPARIN SODIUM 5000 UNITS: 1000 INJECTION, SOLUTION INTRAVENOUS; SUBCUTANEOUS at 09:44

## 2020-01-27 RX ADMIN — IOHEXOL 90 ML: 300 INJECTION, SOLUTION INTRAVENOUS at 11:50

## 2020-01-27 RX ADMIN — SUGAMMADEX 200 MG: 100 INJECTION, SOLUTION INTRAVENOUS at 11:33

## 2020-01-27 RX ADMIN — GLYCOPYRROLATE 0.2 MG: 0.2 INJECTION INTRAMUSCULAR; INTRAVENOUS at 08:50

## 2020-01-27 RX ADMIN — EPTIFIBATIDE 2 MCG/KG/MIN: 75 INJECTION INTRAVENOUS at 11:23

## 2020-01-27 RX ADMIN — SERTRALINE HYDROCHLORIDE 150 MG: 50 TABLET ORAL at 20:10

## 2020-01-27 ASSESSMENT — ENCOUNTER SYMPTOMS
PALPITATIONS: 0
BRUISES/BLEEDS EASILY: 0
DEPRESSION: 0
BLURRED VISION: 0
BLOOD IN STOOL: 0
SPEECH CHANGE: 0
CHILLS: 0
LOSS OF CONSCIOUSNESS: 0
SEIZURES: 0
SORE THROAT: 0
SPUTUM PRODUCTION: 0
BRUISES/BLEEDS EASILY: 1
NAUSEA: 0
VOMITING: 0
FOCAL WEAKNESS: 0
SORE THROAT: 1
BACK PAIN: 0
HEMOPTYSIS: 0
NECK PAIN: 0
HALLUCINATIONS: 0
NERVOUS/ANXIOUS: 0
EYE DISCHARGE: 0
SHORTNESS OF BREATH: 0
COUGH: 0
STRIDOR: 0
EYE PAIN: 0
FEVER: 0
ABDOMINAL PAIN: 0
DOUBLE VISION: 0
MYALGIAS: 0
SENSORY CHANGE: 0
EYE REDNESS: 0
HEADACHES: 0
DIARRHEA: 0
FLANK PAIN: 0
SINUS PAIN: 0

## 2020-01-27 ASSESSMENT — COPD QUESTIONNAIRES
COPD SCREENING SCORE: 2
DO YOU EVER COUGH UP ANY MUCUS OR PHLEGM?: NO/ONLY WITH OCCASIONAL COLDS OR INFECTIONS
HAVE YOU SMOKED AT LEAST 100 CIGARETTES IN YOUR ENTIRE LIFE: NO/DON'T KNOW
DURING THE PAST 4 WEEKS HOW MUCH DID YOU FEEL SHORT OF BREATH: NONE/LITTLE OF THE TIME

## 2020-01-27 ASSESSMENT — LIFESTYLE VARIABLES
EVER_SMOKED: NEVER
SUBSTANCE_ABUSE: 0

## 2020-01-27 ASSESSMENT — PAIN SCALES - GENERAL: PAIN_LEVEL: 1

## 2020-01-27 NOTE — OR SURGEON
Immediate Post- Operative Note        PostOp Diagnosis:ACOM aneurym      Procedure(s): Endovascular repair of ACOM aneurym      Estimated Blood Loss: Less than 5 ml        Complications: None            1/27/2020     11:49 AM     Efrain Dudley M.D.

## 2020-01-27 NOTE — ANESTHESIA PROCEDURE NOTES
Airway  Date/Time: 1/27/2020 8:49 AM  Performed by: Ara Delvalle M.D.  Authorized by: Ara Delvalle M.D.     Location:  OR  Urgency:  Elective  Indications for Airway Management:  Anesthesia  Spontaneous Ventilation: absent    Sedation Level:  Deep  Preoxygenated: Yes    Patient Position:  Sniffing  Mask Difficulty Assessment:  0 - not attempted  Final Airway Type:  Supraglottic airway  Final Supraglottic Airway:  Standard LMA  SGA Size:  4  Cuff Pressure (cm H2O):  40  Airway Seal Pressure (cm H2O):  20  Number of Attempts at Approach:  1   Cuff pressure measured via integrated color-based cuff pressure indicator.

## 2020-01-27 NOTE — ANESTHESIA PREPROCEDURE EVALUATION
"65yo F here for cerebral aneurysm coiling.    No Known Allergies     Relevant Problems   ANESTHESIA (within normal limits)      PULMONARY (within normal limits)      CARDIAC   (+) Intracranial aneurysm      GI (within normal limits)         (+) Hepatitis C (treated)   (-) Renal disease      Other   (+) Chronic prescription benzodiazepine use     Past Medical History:   Diagnosis Date   • Anxiety    • Arthritis     osteo, fingers   • Depression    • Hepatitis C 2000    reprts recieved treatment... \"cured\"   • High cholesterol    • Intracranial aneurysm    • Restless leg syndrome       No current facility-administered medications on file prior to encounter.      Current Outpatient Medications on File Prior to Encounter   Medication Sig Dispense Refill   • quetiapine (SEROQUEL) 300 MG tablet Take 300 mg by mouth every bedtime.     • Ibuprofen-diphenhydrAMINE Cit (ADVIL PM PO) Take 2 Tabs by mouth at bedtime as needed (For sleep and pain).     • sertraline (ZOLOFT) 100 MG Tab Take 150 mg by mouth every morning. Indications: Generalized Anxiety Disorder, Major Depressive Disorder     • atorvastatin (LIPITOR) 40 MG Tab Take 40 mg by mouth every evening.     • ROPINIRole (REQUIP) 0.25 MG Tab Take 0.25 mg by mouth every bedtime. Indications: Restless Leg Syndrome     • ALPRAZolam (XANAX) 1 MG Tab Take 1 mg by mouth 3 times a day as needed for Sleep or Anxiety.     • Calcium-Magnesium-Zinc (CALCIUM-MAGNESUIUM-ZINC PO) Take 1 Tab by mouth every evening.     • Omega-3 Fatty Acids (SALMON OIL-1000 PO) Take 1 Tab by mouth every day.     • Multiple Vitamins-Minerals (MULTIVITAL) Tab Take 1 Tab by mouth every day.     • Coenzyme Q10 (CO Q 10 PO) Take 1 Cap by mouth every day.     • Cholecalciferol (HM VITAMIN D3) 4000 units Cap Take 4,000 Units by mouth every day.     • Magnesium 250 MG Tab Take 250 mg by mouth every day.     • Cyanocobalamin (VITAMIN B-12) 5000 MCG TABLET DISPERSIBLE Take 5,000 mcg by mouth every day.      " "  Past Surgical History:   Procedure Laterality Date   • BOWEL RESECTION  2000    Resection, Bowel   • PLASTIC SURGERY      \"face lift\"      Vitals:    01/27/20 0625   BP: (!) 89/62   Pulse: 71   Resp: 16   Temp: 36.5 °C (97.7 °F)   SpO2: 94%      Physical Exam    Airway   Mallampati: II  TM distance: <3 FB  Neck ROM: limited       Cardiovascular   Rhythm: regular  Rate: normal     Dental - normal exam         Pulmonary   Breath sounds clear to auscultation     Abdominal    Neurological - normal exam                 Anesthesia Plan    ASA 3 (cerebral aneurysm)   ASA physical status 3 criteria: other (comment)    Plan - general       Airway plan will be LMA      Plan Factors:   Patient was not previously instructed to abstain from smoking on day of procedure.  Patient did not smoke on day of procedure.      Induction: intravenous    Postoperative Plan: Postoperative administration of opioids is intended.    Pertinent diagnostic labs and testing reviewed    Informed Consent:    Anesthetic plan and risks discussed with patient.    Use of blood products discussed with: patient whom consented to blood products.         "

## 2020-01-27 NOTE — ANESTHESIA PROCEDURE NOTES
Arterial Line  Performed by: Ara Delvalle M.D.  Authorized by: Ara Delvalle M.D.     Start Time:  1/27/2020 8:55 AM  End Time:  1/27/2020 8:57 AM  Localization: ultrasound guidance  Image captured, interpreted and electronically stored.  Patient Location:  OR  Indication: continuous blood pressure monitoring and blood sampling needed    Catheter Size:  20 G  Seldinger Technique?: Yes    Laterality:  Left  Site:  Radial artery  Line Secured:  Antimicrobial disc, tape and transparent dressing  Events: patient tolerated procedure well with no complications     Direct thread under ultrasound guidance without use of through-and-through technique

## 2020-01-27 NOTE — H&P
"History and Physical    Date: 1/27/2020    PCP: Nai Ocasio M.D.        HPI: This is a 64 y.o. female who is presenting ACOM aneurysm    Past Medical History:   Diagnosis Date   • Anxiety    • Arthritis     osteo, fingers   • Depression    • Hepatitis C 2000    reprts recieved treatment... \"cured\"   • High cholesterol    • Intracranial aneurysm    • Restless leg syndrome        Past Surgical History:   Procedure Laterality Date   • BOWEL RESECTION  2000    Resection, Bowel   • PLASTIC SURGERY      \"face lift\"       Current Facility-Administered Medications   Medication Dose Route Frequency Provider Last Rate Last Dose   • lidocaine (XYLOCAINE) 1 % injection 0.5 mL  0.5 mL Intradermal Once PRN Efrain Dudley M.D.       • lactated ringers infusion   Intravenous Continuous Efrain Dudley M.D. 10 mL/hr at 01/27/20 0646          Social History     Socioeconomic History   • Marital status:      Spouse name: Not on file   • Number of children: Not on file   • Years of education: Not on file   • Highest education level: Not on file   Occupational History   • Not on file   Social Needs   • Financial resource strain: Not on file   • Food insecurity:     Worry: Not on file     Inability: Not on file   • Transportation needs:     Medical: Not on file     Non-medical: Not on file   Tobacco Use   • Smoking status: Never Smoker   • Smokeless tobacco: Never Used   Substance and Sexual Activity   • Alcohol use: Not Currently   • Drug use: Yes     Types: Inhaled     Comment: marijuana  for sleep   • Sexual activity: Not on file   Lifestyle   • Physical activity:     Days per week: Not on file     Minutes per session: Not on file   • Stress: Not on file   Relationships   • Social connections:     Talks on phone: Not on file     Gets together: Not on file     Attends [employer] service: Not on file     Active member of club or organization: Not on file     Attends meetings of clubs or organizations: Not on file     " Relationship status: Not on file   • Intimate partner violence:     Fear of current or ex partner: Not on file     Emotionally abused: Not on file     Physically abused: Not on file     Forced sexual activity: Not on file   Other Topics Concern   • Not on file   Social History Narrative   • Not on file       History reviewed. No pertinent family history.    Allergies:  Patient has no known allergies.    Review of Systems:  ACOm aneurym    Physical Exam   Constitutional: She is oriented to person, place, and time. She appears well-developed and well-nourished.   HENT:   Head: Normocephalic.   Eyes: No scleral icterus.   Pulmonary/Chest: Effort normal. No stridor. No respiratory distress.   Neurological: She is alert and oriented to person, place, and time. No cranial nerve deficit. Coordination normal.   Skin: Skin is dry. No rash noted. No erythema. No pallor.   Psychiatric: She has a normal mood and affect. Her behavior is normal. Judgment and thought content normal.       Vital Signs  Blood Pressure : (!) 89/62   Temperature: 36.5 °C (97.7 °F)   Pulse: 71   Respiration: 16   Pulse Oximetry: 94 %        Labs:  Recent Labs     01/27/20  0650   WBC 4.3*   RBC 4.42   HEMOGLOBIN 13.7   HEMATOCRIT 41.4   MCV 93.7   MCH 31.0   MCHC 33.1*   RDW 45.7   PLATELETCT 198   MPV 9.8     Recent Labs     01/27/20  0650   SODIUM 141   POTASSIUM 4.3   CHLORIDE 112   CO2 21   GLUCOSE 97   BUN 19   CREATININE 1.11   CALCIUM 9.1     Recent Labs     01/27/20  0650   INR 0.93     Recent Labs     01/27/20  0650   INR 0.93       Radiology:  No orders to display             Assessment and Plan:This is a 64 y.o. ACOM aneurysm    Plan:ACOm aneurym endovascualr repair

## 2020-01-27 NOTE — H&P
Hospital Medicine History & Physical Note    Date of Service  1/27/2020    Primary Care Physician  Nai Ocasio M.D.    Consultants  Neuro interventional radiology  Intensive care    Code Status  Full code    Chief Complaint  Headache admitted for elective anterior communicating artery aneurysm stent placement    History of Presenting Illness  64 y.o. female with a past medical history of anxiety, arthritis, dyslipidemia, anterior communicating artery aneurysm and restless leg syndrome who is admitted 1/27/2020 for elective endovascular repair and stent placement of anterior communicating artery aneurysm with Dr. Perez from interventional neuroradiology.  Patient underwent the procedure and tolerated well she did have mild oozing from the catheter insertion site after the procedure.  She also developed some hypotension however was denying dizziness and lightheadedness.  Patient will be admitted to intensive care unit for close monitoring and frequent neuro checks.  She has been continued on antiplatelet infusion therapy with eptifibatide until 10 PM today.  She will then be transitioned to Brilinta and aspirin.  She denies focal weakness or sensory changes.  She denies vision loss and double vision.  She does not have nausea or vomiting    Review of Systems  Review of Systems   Constitutional: Negative for chills and fever.   HENT: Negative for congestion and sore throat.    Eyes: Negative for pain, discharge and redness.   Respiratory: Negative for cough, hemoptysis, sputum production, shortness of breath and stridor.    Cardiovascular: Negative for chest pain, palpitations and leg swelling.   Gastrointestinal: Negative for abdominal pain, blood in stool, diarrhea and vomiting.   Genitourinary: Negative for flank pain, hematuria and urgency.   Musculoskeletal: Negative for myalgias.        Mild oozing from catheter insertion site   Skin: Negative.    Neurological: Negative for speech change, focal weakness,  seizures and loss of consciousness.   Endo/Heme/Allergies: Bruises/bleeds easily.   Psychiatric/Behavioral: Negative for hallucinations and suicidal ideas. The patient is not nervous/anxious.      Past Medical History   has a past medical history of Anxiety, Arthritis, Depression, Hepatitis C (2000), High cholesterol, Intracranial aneurysm, and Restless leg syndrome.    Surgical History   has a past surgical history that includes bowel resection (2000) and plastic surgery.     Family History  family history includes Hypertension in her father.     Social History   reports that she has never smoked. She has never used smokeless tobacco. She reports previous alcohol use. She reports current drug use. Drug: Inhaled.    Allergies  No Known Allergies    Medications  Prior to Admission Medications   Prescriptions Last Dose Informant Patient Reported? Taking?   ALPRAZolam (XANAX) 1 MG Tab 1/27/2020 at 0440 Patient Yes No   Sig: Take 1 mg by mouth 3 times a day as needed for Sleep or Anxiety.   Calcium-Magnesium-Zinc (CALCIUM-MAGNESUIUM-ZINC PO) 1/26/2020 at 1800 Patient Yes No   Sig: Take 1 Tab by mouth every evening.   Cholecalciferol (HM VITAMIN D3) 4000 units Cap 1/26/2020 at 0900 Patient Yes No   Sig: Take 4,000 Units by mouth every day.   Coenzyme Q10 (CO Q 10 PO) 1/26/2020 at 0900 Patient Yes No   Sig: Take 1 Cap by mouth every day.   Cyanocobalamin (VITAMIN B-12) 5000 MCG TABLET DISPERSIBLE 1/26/2020 at 0900 Patient Yes No   Sig: Take 5,000 mcg by mouth every day.   Ibuprofen-diphenhydrAMINE Cit (ADVIL PM PO) >2 nights at PM Patient Yes Yes   Sig: Take 2 Tabs by mouth at bedtime as needed (For sleep and pain).   Magnesium 250 MG Tab 1/26/2020 at 0900 Patient Yes No   Sig: Take 250 mg by mouth every day.   Multiple Vitamins-Minerals (MULTIVITAL) Tab 1/26/2020 at 0900 Patient Yes No   Sig: Take 1 Tab by mouth every day.   Omega-3 Fatty Acids (SALMON OIL-1000 PO) 1/26/2020 at 0900 Patient Yes No   Sig: Take 1 Tab by  mouth every day.   ROPINIRole (REQUIP) 0.25 MG Tab 1/26/2020 at 1900 Patient Yes No   Sig: Take 0.25 mg by mouth every bedtime. Indications: Restless Leg Syndrome   atorvastatin (LIPITOR) 40 MG Tab 1/26/2020 at 1800 Patient Yes No   Sig: Take 40 mg by mouth every evening.   quetiapine (SEROQUEL) 300 MG tablet 1/26/2020 at 1900 Patient Yes Yes   Sig: Take 300 mg by mouth every bedtime.   sertraline (ZOLOFT) 100 MG Tab 1/26/2020 at 1900 Patient Yes No   Sig: Take 150 mg by mouth every morning. Indications: Generalized Anxiety Disorder, Major Depressive Disorder      Facility-Administered Medications: None     Physical Exam  Temp:  [36.5 °C (97.7 °F)-37.2 °C (99 °F)] 36.7 °C (98.1 °F)  Pulse:  [57-83] 77  Resp:  [7-63] 62  BP: ()/(39-62) 112/58  SpO2:  [94 %-100 %] 99 %    Physical Exam  Constitutional:       General: She is not in acute distress.     Appearance: She is not toxic-appearing.   HENT:      Head: Normocephalic and atraumatic.      Right Ear: External ear normal.      Left Ear: External ear normal.      Nose: No congestion or rhinorrhea.      Mouth/Throat:      Mouth: Mucous membranes are moist.      Pharynx: No oropharyngeal exudate or posterior oropharyngeal erythema.   Eyes:      General: No scleral icterus.        Right eye: No discharge.         Left eye: No discharge.      Conjunctiva/sclera: Conjunctivae normal.      Pupils: Pupils are equal, round, and reactive to light.   Neck:      Musculoskeletal: Neck supple. No neck rigidity or muscular tenderness.   Cardiovascular:      Heart sounds: No friction rub. No gallop.    Pulmonary:      Breath sounds: No stridor. No wheezing or rhonchi.   Abdominal:      General: There is no distension.      Palpations: Abdomen is soft.      Tenderness: There is no tenderness. There is no guarding or rebound.   Musculoskeletal:         General: No swelling.      Right lower leg: No edema.      Left lower leg: No edema.      Comments: Clean dressing  Mild oozing  from catheter insertion site.  Sandbag in place   Skin:     Coloration: Skin is pale. Skin is not jaundiced.      Findings: No bruising or erythema.   Neurological:      Mental Status: She is alert and oriented to person, place, and time.      Cranial Nerves: No cranial nerve deficit.      Sensory: No sensory deficit.      Motor: No weakness.   Psychiatric:         Mood and Affect: Mood normal.         Judgment: Judgment normal.       Laboratory:  Recent Labs     01/27/20  0650   WBC 4.3*   RBC 4.42   HEMOGLOBIN 13.7   HEMATOCRIT 41.4   MCV 93.7   MCH 31.0   MCHC 33.1*   RDW 45.7   PLATELETCT 198   MPV 9.8     Recent Labs     01/27/20  0650   SODIUM 141   POTASSIUM 4.3   CHLORIDE 112   CO2 21   GLUCOSE 97   BUN 19   CREATININE 1.11   CALCIUM 9.1     Recent Labs     01/27/20  0650   GLUCOSE 97     Recent Labs     01/27/20  0650   INR 0.93     No results for input(s): NTPROBNP in the last 72 hours.      No results for input(s): TROPONINT in the last 72 hours.    Urinalysis:    No results found     Imaging:  IR-EMBOLIZE-NEURO-INTRACRANIAL    (Results Pending)     Assessment/Plan:    * Intracranial aneurysm  Assessment & Plan  With headaches. Admitted for elective endovascular repair and stent placement of anterior communicating artery aneurysm with Dr. Perez from interventional neuroradiology.  Tolerated the procedure well apart from mild oozing from the catheter insertion site.   She has been continued on antiplatelet infusion therapy with eptifibatide until 10 PM today 1/27.    She will then be transitioned to Brilinta and aspirin.      Chronic kidney disease (CKD), stage III (moderate) ([facility])- (present on admission)  Assessment & Plan  Got contrast with the procedure today  Avoid nephrotoxins as much as possible, renally dose medications, monitor inputs and outputs   We will order a BMP to follow-up on renal function tomorrow      Anxiety  Assessment & Plan  At baseline.  Resume home sertraline and alprazolam.  Watch  vital signs and respiratory status closely.    Hyperlipidemia  Assessment & Plan  Resume home atorvastatin     Manic depressive illness (HCC)  Assessment & Plan  At baseline.  Resume home sertraline    Restless leg syndrome  Assessment & Plan  Resume home ropinirole     VTE prophylaxis: SCDs

## 2020-01-27 NOTE — ANESTHESIA POSTPROCEDURE EVALUATION
Patient: Tamika Appiah    Procedure Summary     Date:  01/27/20 [facility]    Anesthesia Start:  0841 Anesthesia Stop:  1150    Procedure:  IR-EMBOLIZE-NEURO-INTRACRANIAL Diagnosis:       Intracranial aneurysm      Cerebral aneurysm, nonruptured      (ACOMM aneurysm)      (embolization with Web Device)    Scheduled Providers:  Efrain Dudley M.D. Responsible Provider:  Ara Delvalle M.D.    Anesthesia Type:  general ASA Status:  3          Final Anesthesia Type: general  Last vitals  BP   Blood Pressure : (!) 99/49   Temp   37.2 °C (99 °F)    Pulse   Pulse: 61   Resp   12    SpO2   100 %      Anesthesia Post Evaluation    Patient location during evaluation: PACU  Patient participation: complete - patient participated  Level of consciousness: awake and alert  Pain score: 1    Airway patency: patent  Anesthetic complications: no  Cardiovascular status: hemodynamically stable  Respiratory status: acceptable  Hydration status: euvolemic    PONV: none

## 2020-01-27 NOTE — ANESTHESIA TIME REPORT
Anesthesia Start and Stop Event Times     Date Time Event    1/27/2020 0750 Ready for Procedure     0841 Anesthesia Start     1150 Anesthesia Stop        Responsible Staff  01/27/20    Name Role Begin End    Ara Delvalle M.D. Anesth 0841 1150        Preop Diagnosis (Free Text):  Pre-op Diagnosis             Preop Diagnosis (Codes):    Post op Diagnosis  Intracranial aneurysm      Premium Reason  Non-Premium    Comments:

## 2020-01-27 NOTE — PROGRESS NOTES
IR Nurse Procedure Note:    Dr. Dudley and Dr. Delvalle consented patient prior to procedure; all questions answered.    Site  Confirme by MD, RT and RN pre procedure.     Dr. Dudley performed intracranial aneurysm embolization with general anesthesia by DR. Delvalle.  Implant place by Luis Enrique.  All vital signs monitoring and medication administration by anesthesia services. - See anesthesia record.  Right femoral artery access  Arterial line placed by MD Delvalle, [place]  Jacobson catheter place      WEB SL Aneurysm Embolization system  applied to ACOM- Anterior Communicating artery, CDI and soft; pressure held x 0 minutes. AngioSeal 8F deployed at 11:32 Report given to ***RN.  RN transported pt to [facility] with Dr. Delvalle      Implants:   Anterior Communicating artery - ACOM    WEB SL Aneurysm Embolization system   size 6.0mm x 3.0mm X 184 cm   Ref W4-6-3   Lot [id number]    Exp 05-    LVIS jr Intraluminal Support  size 2.5mm X 23 mm X 19mm   Ref [id number]   Lot [id number]   Exp 12-    AngioSeal STS Plus 8F  Ref 234241  Lot [id number]  Exp 08-

## 2020-01-28 VITALS
DIASTOLIC BLOOD PRESSURE: 63 MMHG | HEIGHT: 68 IN | RESPIRATION RATE: 16 BRPM | SYSTOLIC BLOOD PRESSURE: 139 MMHG | HEART RATE: 95 BPM | WEIGHT: 141.54 LBS | BODY MASS INDEX: 21.45 KG/M2 | TEMPERATURE: 98.8 F | OXYGEN SATURATION: 94 %

## 2020-01-28 DIAGNOSIS — I72.9 ANEURYSM (HCC): ICD-10-CM

## 2020-01-28 PROBLEM — D72.819 LEUKOPENIA: Status: RESOLVED | Noted: 2020-01-27 | Resolved: 2020-01-28

## 2020-01-28 LAB
ALBUMIN SERPL BCP-MCNC: 3.1 G/DL (ref 3.2–4.9)
ALBUMIN/GLOB SERPL: 1.3 G/DL
ALP SERPL-CCNC: 50 U/L (ref 30–99)
ALT SERPL-CCNC: 10 U/L (ref 2–50)
ANION GAP SERPL CALC-SCNC: 8 MMOL/L (ref 0–11.9)
AST SERPL-CCNC: 18 U/L (ref 12–45)
BASOPHILS # BLD AUTO: 0.3 % (ref 0–1.8)
BASOPHILS # BLD: 0.02 K/UL (ref 0–0.12)
BILIRUB SERPL-MCNC: 0.2 MG/DL (ref 0.1–1.5)
BUN SERPL-MCNC: 10 MG/DL (ref 8–22)
CALCIUM SERPL-MCNC: 8 MG/DL (ref 8.5–10.5)
CHLORIDE SERPL-SCNC: 108 MMOL/L (ref 96–112)
CO2 SERPL-SCNC: 22 MMOL/L (ref 20–33)
CREAT SERPL-MCNC: 1 MG/DL (ref 0.5–1.4)
EOSINOPHIL # BLD AUTO: 0.06 K/UL (ref 0–0.51)
EOSINOPHIL NFR BLD: 0.8 % (ref 0–6.9)
ERYTHROCYTE [DISTWIDTH] IN BLOOD BY AUTOMATED COUNT: 46 FL (ref 35.9–50)
GLOBULIN SER CALC-MCNC: 2.4 G/DL (ref 1.9–3.5)
GLUCOSE SERPL-MCNC: 107 MG/DL (ref 65–99)
HCT VFR BLD AUTO: 34.1 % (ref 37–47)
HGB BLD-MCNC: 11.3 G/DL (ref 12–16)
IMM GRANULOCYTES # BLD AUTO: 0.03 K/UL (ref 0–0.11)
IMM GRANULOCYTES NFR BLD AUTO: 0.4 % (ref 0–0.9)
LYMPHOCYTES # BLD AUTO: 1.3 K/UL (ref 1–4.8)
LYMPHOCYTES NFR BLD: 17.4 % (ref 22–41)
MAGNESIUM SERPL-MCNC: 1.8 MG/DL (ref 1.5–2.5)
MCH RBC QN AUTO: 31.2 PG (ref 27–33)
MCHC RBC AUTO-ENTMCNC: 33.1 G/DL (ref 33.6–35)
MCV RBC AUTO: 94.2 FL (ref 81.4–97.8)
MONOCYTES # BLD AUTO: 0.55 K/UL (ref 0–0.85)
MONOCYTES NFR BLD AUTO: 7.4 % (ref 0–13.4)
NEUTROPHILS # BLD AUTO: 5.5 K/UL (ref 2–7.15)
NEUTROPHILS NFR BLD: 73.7 % (ref 44–72)
NRBC # BLD AUTO: 0 K/UL
NRBC BLD-RTO: 0 /100 WBC
PLATELET # BLD AUTO: 159 K/UL (ref 164–446)
PMV BLD AUTO: 9.9 FL (ref 9–12.9)
POTASSIUM SERPL-SCNC: 4 MMOL/L (ref 3.6–5.5)
PROT SERPL-MCNC: 5.5 G/DL (ref 6–8.2)
RBC # BLD AUTO: 3.62 M/UL (ref 4.2–5.4)
SODIUM SERPL-SCNC: 138 MMOL/L (ref 135–145)
WBC # BLD AUTO: 7.5 K/UL (ref 4.8–10.8)

## 2020-01-28 PROCEDURE — 83735 ASSAY OF MAGNESIUM: CPT

## 2020-01-28 PROCEDURE — 700102 HCHG RX REV CODE 250 W/ 637 OVERRIDE(OP): Performed by: RADIOLOGY

## 2020-01-28 PROCEDURE — 700105 HCHG RX REV CODE 258: Performed by: HOSPITALIST

## 2020-01-28 PROCEDURE — 700102 HCHG RX REV CODE 250 W/ 637 OVERRIDE(OP): Performed by: HOSPITALIST

## 2020-01-28 PROCEDURE — A9270 NON-COVERED ITEM OR SERVICE: HCPCS | Performed by: INTERNAL MEDICINE

## 2020-01-28 PROCEDURE — A9270 NON-COVERED ITEM OR SERVICE: HCPCS | Performed by: RADIOLOGY

## 2020-01-28 PROCEDURE — 80053 COMPREHEN METABOLIC PANEL: CPT

## 2020-01-28 PROCEDURE — A9270 NON-COVERED ITEM OR SERVICE: HCPCS | Performed by: HOSPITALIST

## 2020-01-28 PROCEDURE — 700102 HCHG RX REV CODE 250 W/ 637 OVERRIDE(OP): Performed by: INTERNAL MEDICINE

## 2020-01-28 PROCEDURE — 99239 HOSP IP/OBS DSCHRG MGMT >30: CPT | Performed by: HOSPITALIST

## 2020-01-28 PROCEDURE — 85025 COMPLETE CBC W/AUTO DIFF WBC: CPT

## 2020-01-28 RX ORDER — ASPIRIN 81 MG/1
81 TABLET ORAL DAILY
Qty: 30 TAB | Refills: 11
Start: 2020-01-29

## 2020-01-28 RX ADMIN — OXYCODONE HYDROCHLORIDE 5 MG: 5 TABLET ORAL at 00:00

## 2020-01-28 RX ADMIN — SENNOSIDES AND DOCUSATE SODIUM 2 TABLET: 8.6; 5 TABLET ORAL at 05:02

## 2020-01-28 RX ADMIN — SODIUM CHLORIDE: 9 INJECTION, SOLUTION INTRAVENOUS at 02:49

## 2020-01-28 RX ADMIN — Medication 400 MG: at 05:02

## 2020-01-28 RX ADMIN — ASPIRIN 81 MG: 81 TABLET, COATED ORAL at 05:02

## 2020-01-28 NOTE — DISCHARGE SUMMARY
Discharge Summary    CHIEF COMPLAINT ON ADMISSION  No chief complaint on file.      Reason for Admission  Cerebral aneurysm, nonruptured     Admission Date  1/27/2020    CODE STATUS  Full Code    HPI & HOSPITAL COURSE  This is a 64 y.o. female here with intracranial aneurysm here for elective aneurysm stenting. Ms. Appiah has a past medical history of hepatitis C and dyslipidemia with a known intracranial aneurysm that underwent a stent of an unruptured anterior communicating artery aneurysm by Dr. Dudley on 1/27/2028 with subsequent ICU admission.  Overnight, she has done well and this morning has no bleeding from the incision site.  Has been able to get up and ambulate does not have any dizziness nor headache.  She is tolerating liquid diet though was not had solids as her throat is little sore but not because of nausea.  Will be discharged today on aspirin 81 mg daily as well as Brilinta was called to the [facility] pharmacy in [place].  Will be on dual antiplatelet therapy and will follow-up with Dr. Dudley in 2 weeks.  He may be a candidate to change Brilinta to Plavix the remainder of her 6-month dual antiplatelet course depending on what she tolerates it and insurance purposes.    Will be discharged home today in stable condition and will return the emergency room for any concerns specifically dizziness, headache, vision changes, neurologic changes.       Therefore, she is discharged in good and stable condition to home with close outpatient follow-up.    The patient recovered much more quickly than anticipated on admission.    Discharge Date  1/28/20    FOLLOW UP ITEMS POST DISCHARGE  Post Angio-Seal instructions specifically no lifting greater than 5 pounds no bath nor swimming or soaking in a tub for 5 days but she may shower.  After 5 days she has regular activities.    DISCHARGE DIAGNOSES  Principal Problem:    Intracranial aneurysm POA: Unknown  Active Problems:    Hyperlipidemia POA: Unknown     Anxiety POA: Unknown    Chronic kidney disease (CKD), stage III (moderate) (HCC) POA: Yes    Chronic prescription benzodiazepine use POA: Unknown    Hepatitis C POA: Unknown    Restless leg syndrome POA: Unknown    Manic depressive illness (HCC) POA: Unknown  Resolved Problems:    Leukopenia POA: Unknown      FOLLOW UP  No future appointments.  Nai Ocasio M.D.  [address] [facility] [place] [id number] [phone]    Schedule an appointment as soon as possible for a visit      Efrain Dudley M.D.  [address] [facility]  [address]  [place] [address]  [phone]    In 2 weeks        MEDICATIONS ON DISCHARGE     Medication List      START taking these medications      Instructions   aspirin 81 MG EC tablet  Start taking on:  January 29, 2020   Take 1 Tab by mouth every day.  Dose:  81 mg     ticagrelor 90 MG Tabs tablet  Commonly known as:  BRILINTA   Take 1 Tab by mouth 2 Times a Day.  Dose:  90 mg        CONTINUE taking these medications      Instructions   ADVIL PM PO   Take 2 Tabs by mouth at bedtime as needed (For sleep and pain).  Dose:  2 Tab     ALPRAZolam 1 MG Tabs  Commonly known as:  XANAX   Take 1 mg by mouth 3 times a day as needed for Sleep or Anxiety.  Dose:  1 mg     atorvastatin 40 MG Tabs  Commonly known as:  LIPITOR   Take 40 mg by mouth every evening.  Dose:  40 mg     CALCIUM-MAGNESUIUM-ZINC PO   Take 1 Tab by mouth every evening.  Dose:  1 Tab     CO Q 10 PO   Take 1 Cap by mouth every day.  Dose:  1 Cap     HM VITAMIN D3 4000 units Caps  Generic drug:  Cholecalciferol   Take 4,000 Units by mouth every day.  Dose:  4,000 Units     Magnesium 250 MG Tabs   Take 250 mg by mouth every day.  Dose:  250 mg     MULTIVITAL Tabs   Take 1 Tab by mouth every day.  Dose:  1 Tab     quetiapine 300 MG tablet  Commonly known as:  SEROQUEL   Take 300 mg by mouth every bedtime.  Dose:  300 mg     REQUIP 0.25 MG Tabs  Generic drug:  ROPINIRole   Take 0.25 mg by mouth every bedtime. Indications: Restless  Leg Syndrome  Dose:  0.25 mg     SALMON OIL-1000 PO   Take 1 Tab by mouth every day.  Dose:  1 Tab     sertraline 100 MG Tabs  Commonly known as:  ZOLOFT   Take 150 mg by mouth every morning. Indications: Generalized Anxiety Disorder, Major Depressive Disorder  Dose:  150 mg     Vitamin B-12 5000 MCG Tbdp   Take 5,000 mcg by mouth every day.  Dose:  5,000 mcg            Allergies  No Known Allergies    DIET  Orders Placed This Encounter   Procedures   • Diet Order Regular, Cardiac     Standing Status:   Standing     Number of Occurrences:   1     Order Specific Question:   Diet:     Answer:   Regular [1]     Order Specific Question:   Diet:     Answer:   Cardiac [6]       ACTIVITY  As above    CONSULTATIONS  Critical care  And eventual radiology    PROCEDURES  Stenting of the unruptured anterior communicating artery aneurysm    LABORATORY  Lab Results   Component Value Date    SODIUM 138 01/28/2020    POTASSIUM 4.0 01/28/2020    CHLORIDE 108 01/28/2020    CO2 22 01/28/2020    GLUCOSE 107 (H) 01/28/2020    BUN 10 01/28/2020    CREATININE 1.00 01/28/2020        Lab Results   Component Value Date    WBC 7.5 01/28/2020    HEMOGLOBIN 11.3 (L) 01/28/2020    HEMATOCRIT 34.1 (L) 01/28/2020    PLATELETCT 159 (L) 01/28/2020      Imaging:  IR-EMBOLIZE-NEURO-INTRACRANIAL    (Results Pending)       Total time of the discharge process exceeds 32 minutes.

## 2020-01-28 NOTE — PROGRESS NOTES
2-RN skin check performed with ANN Sherwood. Noted as follows:   -R groin (Angiosealed) sheath site with dry gauze and tegaderm dressing. Dressing CDI without shadowing. Dressing changed 1/27/20 at 2018.  -Healing (scabbed) abrasions/excoriations to R posterior shoulder and upper back. Pt states this was due to a scrape she sustained at home recently.     No pressure concerns. Preventative Mepilex placed this shift while assessing posterior. Elbows and heels intact.    Devices in place include: Bilateral SCD's, BP cuff, SpO2 finger probe, EKG leads and electrode stickers, Jacobson catheter, PIV x2, arterial line.     Interventions in place include:   -Q2h turns as allowed by pt, especially while sleeping.   -Educated on pathophysiology of aneurysm repair and rationale for frequent groin checks to ensure no bleeding (and associated moisture/breakdown) or hematoma.  -Repositioning and checking beneath ,edcal devices to ensure minimal contact with skin.   -Use of pillows beneath extremities for floating/padding.

## 2020-01-28 NOTE — PROGRESS NOTES
Dr. Dudley not near a computer to enter consult for intensivist.  This RN provided Dr. HASSAN with phone number of Dr. Petty to consult via phone.

## 2020-01-28 NOTE — ASSESSMENT & PLAN NOTE
At baseline.  Resume home sertraline and alprazolam.  Watch vital signs and respiratory status closely.

## 2020-01-28 NOTE — DISCHARGE PLANNING
Pt dc home with spouse. No needs.     Care Transition Team Assessment    Information Source  Orientation : Oriented x 4  Information Given By: Patient  Who is responsible for making decisions for patient? : Patient    Readmission Evaluation  Is this a readmission?: No    Elopement Risk  Legal Hold: No  Ambulatory or Self Mobile in Wheelchair: No-Not an Elopement Risk  Elopement Risk: Not at Risk for Elopement    Discharge Preparedness  What is your plan after discharge?: Home with help  What are your discharge supports?: Spouse  Prior Functional Level: Independent with Activities of Daily Living, Independent with Medication Management    Functional Assesment  Prior Functional Level: Independent with Activities of Daily Living, Independent with Medication Management    Finances  Financial Barriers to Discharge: No  Prescription Coverage: Yes    Vision / Hearing Impairment  Hearing Impairment: (No apparent deficit)    Advance Directive  Advance Directive?: None    Domestic Abuse  Physical Abuse or Sexual Abuse: No  Verbal Abuse or Emotional Abuse: No  Possible Abuse Reported to:: Not Applicable    Psychological Assessment  History of Substance Abuse: None  History of Psychiatric Problems: No  Non-compliant with Treatment: No  Newly Diagnosed Illness: No    Anticipated Discharge Information  Anticipated discharge disposition: Home  Discharge Address: [address] [place] [id number]  Discharge Contact Phone Number: [phone]

## 2020-01-28 NOTE — DISCHARGE INSTRUCTIONS
Discharge Instructions    Discharged to home by car with relative. Discharged via wheelchair, hospital escort: Yes.  Special equipment needed: Not Applicable    Be sure to schedule a follow-up appointment with your primary care doctor or any specialists as instructed.     Discharge Plan:   Diet Plan: Discussed  Activity Level: Discussed  Confirmed Follow up Appointment: Patient to Call and Schedule Appointment  Confirmed Symptoms Management: Discussed  Medication Reconciliation Updated: Yes    I understand that a diet low in cholesterol, fat, and sodium is recommended for good health. Unless I have been given specific instructions below for another diet, I accept this instruction as my diet prescription.   Other diet: Regular    Special Instructions: None    · Is patient discharged on Warfarin / Coumadin?   No     Depression / Suicide Risk    As you are discharged from this [facility] facility, it is important to learn how to keep safe from harming yourself.    Recognize the warning signs:  · Abrupt changes in personality, positive or negative- including increase in energy   · Giving away possessions  · Change in eating patterns- significant weight changes-  positive or negative  · Change in sleeping patterns- unable to sleep or sleeping all the time   · Unwillingness or inability to communicate  · Depression  · Unusual sadness, discouragement and loneliness  · Talk of wanting to die  · Neglect of personal appearance   · Rebelliousness- reckless behavior  · Withdrawal from people/activities they love  · Confusion- inability to concentrate     If you or a loved one observes any of these behaviors or has concerns about self-harm, here's what you can do:  · Talk about it- your feelings and reasons for harming yourself  · Remove any means that you might use to hurt yourself (examples: pills, rope, extension cords, firearm)  · Get professional help from the community (Mental Health, Substance Abuse, psychological  counseling)  · Do not be alone:Call your Safe Contact- someone whom you trust who will be there for you.  · Call your local CRISIS HOTLINE 065-3840 or 246-485-3969  · Call your local Children's Mobile Crisis Response Team [place] (473) 104-8995 or www.OneBuckResume  · Call the toll free National Suicide Prevention Hotlines   · National Suicide Prevention Lifeline 122-797-LJTP (5347)  · [employer] Line Network 800-SUICIDE (995-1722)        Embolization  Embolization is a procedure to block one or more of your blood vessels. This procedure may be done to stop bleeding inside your body or to cut off the blood supply to an abnormal growth of blood vessels or a tumor. Embolization may also be used to treat blood vessels that have become weak and are leaking or in danger of rupturing (aneurysm). A type of medication or synthetic material (embolic agents) that is injected into an artery or vein through a long plastic tube (catheter) is used to stop the flow of blood.   LET YOUR HEALTH CARE PROVIDER KNOW ABOUT  · Any allergies you have.  · All medicines you are taking, including vitamins, herbs, eye drops, creams, and over-the-counter medicines.  · Previous problems you or members of your family have had with the use of anesthetics.  · Any blood disorders you have.  · Previous surgeries you have had.  · Medical conditions you have, especially diabetes or kidney problems.  RISKS AND COMPLICATIONS   Generally, this is a safe procedure. However, as with any procedure, problems can occur. Possible problems include:  · Allergic reaction.  · Infection.  · Swelling, bleeding, or bruising at the puncture site.  · Blood clots.  · Damage to the blood vessel.  · Kidney damage.  BEFORE THE PROCEDURE  Before the procedure, you may have blood tests to make sure your kidneys and liver are working well. These tests can also check to see if your blood is clotting like it should. If you take medicine to keep your blood from  clotting (anticoagulants), ask your health care provider when you should stop taking them.    You may need to stop eating and drinking for 6-8 hours before the procedure. Ask your health care provider.  Make arrangements for someone to take you home. Depending on the procedure, you may be able to go home the same day. However, most people stay overnight in the hospital after this procedure. Ask your health care provider what to expect.  PROCEDURE  Embolization usually takes about 90 minutes. The procedure may vary depending on which part of your body is being treated, but usually the following things will be done:  · You may be given medicine that makes you go to sleep (general anesthetic) or a medicine that numbs only a particular area (local anesthetic). If you are given a local anesthetic, you may also be given medicine to make you relaxed and sleepy (sedative).  · An IV tube will be inserted into your body. Medicine will flow through this tube.  · A needle will be inserted into one of the large blood vessels in your groin (femoral blood vessel).  · A catheter will be inserted into the needle and guided to the area that needs to be treated.  · A dye will be injected through the IV tube and X-rays will be taken. This helps to show the exact location of the blood vessels that are causing the problem.  · The embolic agent will then be injected into the blood vessel.  · More X-rays will be taken to make sure the blood vessel has been closed.  · The catheter will be removed and pressure will be applied to the incision to stop any bleeding.  · A bandage (dressing) will be applied.  AFTER THE PROCEDURE  · You will stay in a recovery area until the anesthesia has worn off. Your blood pressure and pulse will be checked.  · You may also get medication to make you feel comfortable if you have pain, headache, or nausea after the procedure.  · You will need to remain lying down for 6-8 hours. This may mean you have to stay  overnight in the hospital. People usually can leave the hospital within 24 hours after the procedure.  This information is not intended to replace advice given to you by your health care provider. Make sure you discuss any questions you have with your health care provider.  Document Released: 12/23/2014 Document Reviewed: 12/23/2014  [employer] Interactive Patient Education © 2017 [employer]        Discharge Instructions per Rashel Stephenson M.D.    Aspirin and Ticagrelor until you see Dr. Dudley    DIET: healthy    ACTIVITY: as tolerated    DIAGNOSIS: intracranial aneurysm     Return to ER if headache, dizziness, weakness, seizure, vision changes, or any concerns.         Post-angioseal instructions: no lifting greater than 5 lbs and no baths/ swimming/ soaking in tub for 5 days. Shower OK. OK to change dressings to band aid as needed. Resume activity as usual after 5 days.

## 2020-01-28 NOTE — PROGRESS NOTES
Call placed to Dr. De Los Santos regarding new onset pain in patient's L distal arm near site of arterial and IV lines. No apparent infiltration of IV or issues with IV cath site, both flushed and functioning. Patient refusing Tylenol for mild pain, complaining of 7/10 throbbing at site. New orders for Oxycodone 5mg Q4h PRN pain.

## 2020-01-28 NOTE — ASSESSMENT & PLAN NOTE
Got contrast with the procedure today  Avoid nephrotoxins as much as possible, renally dose medications, monitor inputs and outputs   We will order a BMP to follow-up on renal function tomorrow

## 2020-01-28 NOTE — CONSULTS
Critical Care Consultation    Date of consult: 1/27/2020    Referring Physician  Efrain Dudley M.D.    Reason for Consultation  Critical care management post cerebrovascular stenting in interventional radiology    History of Presenting Illness  64 y.o. female with a past medical history of depression, anxiety possibly bipolar, restless leg syndrome and intracranial aneurysm who presented 1/27/2020 for elective aneurysmal stenting by interventional radiology.  Seizure was successfully performed and I believe she was intubated and managed by anesthesia.  Patient was started on Integrilin intravenously by IR.  She will be loaded with oral antiplatelet agents, Brilinta starting tonight as well.  She has a little oozing from her right groin vascular access site and it is currently sandbag with 10 pounds and nurses do not complain of any significant bleeding.  Patient has no pain or neurovascular symptoms in that right lower extremity or right groin.  He did have some labile blood pressure by arterial line noted in the EHR flowsheets but patient is asymptomatic and has normal pressure now.  She denied all neurologic symptoms.    Code Status  Full Code    Review of Systems  Review of Systems   Constitutional: Negative for chills, fever and malaise/fatigue.   HENT: Positive for sore throat (Slight ache postop/post extubation, nothing preop). Negative for congestion and sinus pain.    Eyes: Negative for blurred vision and double vision.   Respiratory: Negative for cough, sputum production and shortness of breath.    Cardiovascular: Negative for chest pain, palpitations and leg swelling.   Gastrointestinal: Negative for abdominal pain, diarrhea, nausea and vomiting.   Genitourinary: Negative for dysuria, flank pain, frequency, hematuria and urgency.   Musculoskeletal: Negative for back pain and neck pain.   Skin: Negative for rash.   Neurological: Negative for sensory change, speech change, focal weakness and headaches.    Endo/Heme/Allergies: Does not bruise/bleed easily.   Psychiatric/Behavioral: Negative for depression (Controlled with medications), substance abuse (Stopped alcohol 23 years ago) and suicidal ideas. The patient is not nervous/anxious.        Past Medical History   has a past medical history of Anxiety, Arthritis, Depression, Hepatitis C (2000), High cholesterol, Intracranial aneurysm, and Restless leg syndrome.    Surgical History   has a past surgical history that includes bowel resection (2000) and plastic surgery.    Family History  family history includes Hypertension in her father.    Social History   reports that she has never smoked. She has never used smokeless tobacco. She reports previous alcohol use. She reports current drug use. Drug: Inhaled.    Medications  Home Medications     Reviewed by Roshni Michel (Pharmacy Tech) on 01/27/20 at 0653  Med List Status: Complete   Medication Last Dose Status   ALPRAZolam (XANAX) 1 MG Tab 1/27/2020 Active   atorvastatin (LIPITOR) 40 MG Tab 1/26/2020 Active   Calcium-Magnesium-Zinc (CALCIUM-MAGNESUIUM-ZINC PO) 1/26/2020 Active   Cholecalciferol (HM VITAMIN D3) 4000 units Cap 1/26/2020 Active   Coenzyme Q10 (CO Q 10 PO) 1/26/2020 Active   Cyanocobalamin (VITAMIN B-12) 5000 MCG TABLET DISPERSIBLE 1/26/2020 Active   Ibuprofen-diphenhydrAMINE Cit (ADVIL PM PO) >2 nights Active   Magnesium 250 MG Tab 1/26/2020 Active   Multiple Vitamins-Minerals (MULTIVITAL) Tab 1/26/2020 Active   Omega-3 Fatty Acids (SALMON OIL-1000 PO) 1/26/2020 Active   quetiapine (SEROQUEL) 300 MG tablet 1/26/2020 Active   ROPINIRole (REQUIP) 0.25 MG Tab 1/26/2020 Active   sertraline (ZOLOFT) 100 MG Tab 1/26/2020 Active              Current Facility-Administered Medications   Medication Dose Route Frequency Provider Last Rate Last Dose   • lactated ringers infusion   Intravenous Continuous Efrain Dudley M.D.   Stopped at 01/27/20 1700   • verapamil (ISOPTIN) injection 20 mg  20 mg  Intravenous Once Efrain Dudley M.D.   Stopped at 01/27/20 1530   • eptifibatide 0.75 mg/mL (INTEGRILIN) infusion  2 mcg/kg/min Intravenous Continuous Efrani Dudley M.D. 9.7 mL/hr at 01/27/20 1123 2 mcg/kg/min at 01/27/20 1123   • [START ON 1/28/2020] aspirin EC (ECOTRIN) tablet 81 mg  81 mg Oral DAILY Efrain Dudley M.D.       • ticagrelor (BRILINTA) tablet 180 mg  180 mg Oral Once Efrain Dudley M.D.       • senna-docusate (PERICOLACE or SENOKOT S) 8.6-50 MG per tablet 2 Tab  2 Tab Oral BID Asem KAREN Paul M.D.   2 Tab at 01/27/20 1750    And   • polyethylene glycol/lytes (MIRALAX) PACKET 1 Packet  1 Packet Oral QDAY PRN Asem KAREN Paul M.D.        And   • magnesium hydroxide (MILK OF MAGNESIA) suspension 30 mL  30 mL Oral QDAY PRN Asem KAREN Paul M.D.        And   • bisacodyl (DULCOLAX) suppository 10 mg  10 mg Rectal QDAY PRN Asem KAREN Paul M.D.       • Respiratory Care per Protocol   Nebulization Continuous RT Asem KAREN Paul M.D.       • NS infusion   Intravenous Continuous Asem KAREN Paul M.D. 83 mL/hr at 01/27/20 1750     • acetaminophen (TYLENOL) tablet 650 mg  650 mg Oral Q6HRS PRN Asem KAREN Paul M.D.       • iohexol (OMNIPAQUE) 300 mg/mL  90 mL Intra-arterial Once Efrain Dudley M.D.       • ALPRAZolam (XANAX) tablet 1 mg  1 mg Oral TID PRN Asesherif Paul M.D.       • atorvastatin (LIPITOR) tablet 40 mg  40 mg Oral Nightly Asem KAREN Paul M.D.       • [START ON 1/28/2020] magnesium oxide (MAG-OX) tablet 400 mg  400 mg Oral DAILY Asem KAREN Paul M.D.       • QUEtiapine (SEROQUEL) tablet 300 mg  300 mg Oral QHS Amanda Paul M.D.       • ROPINIRole (REQUIP) tablet 0.25 mg  0.25 mg Oral QHS Amanda Paul M.D.       • sertraline (ZOLOFT) tablet 150 mg  150 mg Oral DAILY Amanda Paul M.D.       • labetalol (NORMODYNE/TRANDATE) injection 5-10 mg  5-10 mg Intravenous Q6HRS PRN Constantine Lomas M.D.       • hydrALAZINE (APRESOLINE) injection 10-20 mg  10-20 mg  Intravenous Q6HRS PRN Constantine Lomas M.D.           Allergies  No Known Allergies    Vital Signs last 24 hours  Temp:  [36.5 °C (97.7 °F)-37.2 °C (99 °F)] 36.7 °C (98.1 °F)  Pulse:  [57-83] 67  Resp:  [7-63] 14  BP: ()/(39-62) 112/58  SpO2:  [94 %-100 %] 99 %    Physical Exam  Physical Exam  Vitals signs reviewed.   Constitutional:       Appearance: She is not ill-appearing or toxic-appearing.   HENT:      Head: Normocephalic and atraumatic.      Nose: Nose normal.      Mouth/Throat:      Mouth: Mucous membranes are moist.   Eyes:      General: No scleral icterus.     Extraocular Movements: Extraocular movements intact.      Pupils: Pupils are equal, round, and reactive to light.   Neck:      Musculoskeletal: Neck supple. No neck rigidity.      Vascular: No carotid bruit or JVD.   Cardiovascular:      Rate and Rhythm: Normal rate and regular rhythm.  No extrasystoles are present.     Pulses: Normal pulses.      Heart sounds: No murmur.      Comments: Sinus rhythm  Pulmonary:      Effort: Pulmonary effort is normal. No respiratory distress.      Breath sounds: No wheezing, rhonchi or rales.   Abdominal:      General: Abdomen is flat. There is no distension.      Palpations: Abdomen is soft.      Tenderness: There is no tenderness. There is no right CVA tenderness, left CVA tenderness or guarding.   Musculoskeletal:         General: No swelling or tenderness.      Right lower leg: No edema.      Left lower leg: No edema.      Comments: Right groin arterial vascular access site with slight oozing controlled with sandbag, no tenderness, no significant hematoma, pulse strong   Lymphadenopathy:      Cervical: No cervical adenopathy.   Skin:     General: Skin is warm and dry.      Capillary Refill: Capillary refill takes less than 2 seconds.      Coloration: Skin is not cyanotic.      Findings: No lesion or rash.      Nails: There is no clubbing.     Neurological:      General: No focal deficit present.       Mental Status: She is alert and oriented to person, place, and time. Mental status is at baseline.      Cranial Nerves: No cranial nerve deficit.      Sensory: No sensory deficit.      Motor: No weakness.   Psychiatric:         Mood and Affect: Mood normal.         Behavior: Behavior normal. Behavior is cooperative.         Thought Content: Thought content normal.         Fluids    Intake/Output Summary (Last 24 hours) at 1/27/2020 1911  Last data filed at 1/27/2020 1700  Gross per 24 hour   Intake 2212.33 ml   Output 450 ml   Net 1762.33 ml       Laboratory  Recent Results (from the past 48 hour(s))   Prothrombin Time (INR) (plasma)    Collection Time: 01/27/20  6:50 AM   Result Value Ref Range    PT 12.7 12.0 - 14.6 sec    INR 0.93 0.87 - 1.13   Basic Metabolic Panel    Collection Time: 01/27/20  6:50 AM   Result Value Ref Range    Sodium 141 135 - 145 mmol/L    Potassium 4.3 3.6 - 5.5 mmol/L    Chloride 112 96 - 112 mmol/L    Co2 21 20 - 33 mmol/L    Glucose 97 65 - 99 mg/dL    Bun 19 8 - 22 mg/dL    Creatinine 1.11 0.50 - 1.40 mg/dL    Calcium 9.1 8.5 - 10.5 mg/dL    Anion Gap 8.0 0.0 - 11.9   CBC Without Differential    Collection Time: 01/27/20  6:50 AM   Result Value Ref Range    WBC 4.3 (L) 4.8 - 10.8 K/uL    RBC 4.42 4.20 - 5.40 M/uL    Hemoglobin 13.7 12.0 - 16.0 g/dL    Hematocrit 41.4 37.0 - 47.0 %    MCV 93.7 81.4 - 97.8 fL    MCH 31.0 27.0 - 33.0 pg    MCHC 33.1 (L) 33.6 - 35.0 g/dL    RDW 45.7 35.9 - 50.0 fL    Platelet Count 198 164 - 446 K/uL    MPV 9.8 9.0 - 12.9 fL   ESTIMATED GFR    Collection Time: 01/27/20  6:50 AM   Result Value Ref Range    GFR If  59 (A) >60 mL/min/1.73 m 2    GFR If Non African American 49 (A) >60 mL/min/1.73 m 2   ECG    Collection Time: 01/27/20  6:54 AM   Result Value Ref Range    Report       [facility]    Test Date:  2020-01-27  Pt Name:    VALERIE CRUZ                 Department: [facility]  MRN:        [id number]                      Room:        TPREPOOL  Gender:     Female                       Technician: [facility]  :        [id number]                   Requested By:ESTEPHANIA NASHWAJURGEN  Order #:    [id number]                    Reading MD: Fredis Ramirez MD    Measurements  Intervals                                Axis  Rate:       66                           P:          59  SD:         146                          QRS:        54  QRSD:       89                           T:          44  QT:         411  QTc:        431    Interpretive Statements  SINUS RHYTHM  No previous ECG available for comparison  Electronically Signed On 2020 9:11:16 PST by Fredis Ramirez MD     POC ACT (resulted by nursing)    Collection Time: 20  9:54 AM   Result Value Ref Range    Istat Activated Clotting Time 208 (H) 74 - 137 sec       Imaging  IR-EMBOLIZE-NEURO-INTRACRANIAL    (Results Pending)       Assessment/Plan  * Intracranial aneurysm  Assessment & Plan  Status post stenting by IR, clinically doing well without complication  IV Integrilin postop and transitioning to oral agents tonight  Oral Brilinta/ASA  Monitor for bleeding right groin, slight oozing now controlled with sand bagging  Neurochecks every hour initially and reducing in frequency as per IRs protocol  BP control, goal less than 160, as needed IV labetalol/hydralazine-if necessary IV infusion of nicardipine  Low threshold for follow-up CT scan brain, monitoring for the need  Aspiration/seizure precautions    Leukopenia  Assessment & Plan  Mildly low WBC count, no clinical symptoms or history of same, will repeat in a.m.    Anxiety  Assessment & Plan  Clinically doing well now without significant issues of anxiety  Cautious use of benzos if absolutely necessary, patient uses Xanax apparently    Hyperlipidemia  Assessment & Plan  Resume home statin therapy  Diet    Manic depressive illness (HCC)  Assessment & Plan  Controlled with Seroquel and Zoloft.  Patient currently in remission  Resume home  regimen    Restless leg syndrome  Assessment & Plan  Resume home Requip therapy  Monitor for need for additional therapy    Hepatitis C  Assessment & Plan  Managed as an outpatient  No clinical symptoms on review of systems  Check CMP in a.m.    Chronic prescription benzodiazepine use  Assessment & Plan  Monitoring, mood well controlled now, cautious use of benzos if necessary, patient uses Xanax  Managed as an outpatient      Discussed patient condition and risk of morbidity and/or mortality with RN, Patient, hospitalist and Interventional radiology and ICU charge nurse.

## 2020-01-28 NOTE — ASSESSMENT & PLAN NOTE
Monitoring, mood well controlled now, cautious use of benzos if necessary, patient uses Xanax  Managed as an outpatient

## 2020-01-28 NOTE — PROGRESS NOTES
Pt discharged home with . Discharge instructions given. All questions answered at this time. IV catheters removed. Pt able to void post Jacobson removal. VSS. Pt escorted out with RN via wheelchair.

## 2020-01-28 NOTE — PROGRESS NOTES
Call placed to Dr. Dudley to clarify medication order.   Also updated MD that pt has bleeding to R groin site.   Rut replaced and sandbag 10 lb placed.     Currently clarifying which MD to cover pt while in the ICU.

## 2020-01-28 NOTE — ASSESSMENT & PLAN NOTE
Status post stenting by IR, clinically doing well without complication  IV Integrilin postop and transitioning to oral agents tonight  Oral Brilinta/ASA  Monitor for bleeding right groin, slight oozing now controlled with sand bagging  Neurochecks every hour initially and reducing in frequency as per IRs protocol  BP control, goal less than 160, as needed IV labetalol/hydralazine-if necessary IV infusion of nicardipine  Low threshold for follow-up CT scan brain, monitoring for the need  Aspiration/seizure precautions

## 2020-01-28 NOTE — PROGRESS NOTES
Chart check completed.     Bedside neuro exam performed at shift change with outgoing RN without discrepancy barring mild L-sided upper and lower facial droop, which day shift RN did not report noticing since the pt returned from her procedure.     Pt reports having lip injections that are mildly disproportionate, but as stated, upper facial droop with eyebrow raise was also noted.    1958: Page placed to Dr. Dudley to inform. Also updated on remainder of neuro exam which is intact and unchanged from dayshift. No new orders, to call MD again if any other changes are seen overnight. Verified stop order for Integrilin gtt at 2200, and giving Brilinta now. Clarified whether MD would like vascular checks more frequently than Q4h. Orders for Q1h lower vascular checks as long as sandbag is in place.     2010: Sandbag removed for groin dressing change.     2021: Sandbag replaced. Bilat pedal pulses dopplered successfully.

## 2020-01-28 NOTE — ASSESSMENT & PLAN NOTE
With headaches. Admitted for elective endovascular repair and stent placement of anterior communicating artery aneurysm with Dr. Perez from interventional neuroradiology.  Tolerated the procedure well apart from mild oozing from the catheter insertion site.   She has been continued on antiplatelet infusion therapy with eptifibatide until 10 PM today 1/27.    She will then be transitioned to Brilinta and aspirin.

## 2020-01-28 NOTE — PROGRESS NOTES
S/p uncomplicated endovascular neurointervention on 1/27/20 by Efrain Dudley MD (IR, x [phone]) unruptured ACOMM aneurysm treated with WEB device and stent. Admitted to ICU for post procedure neurologic monitoring.     Today, the patient complains of pain at her IV site. Denies headache or vision change. Wants lines out and to go home.  Awake and alert, pleasant. Face symmetric with irregular upper left lip (s/p filler), speech fluent. No drift. Right femoral angio site WNL and nontender. DP/ PT pulses 2+.     From a NeuroInterventional Service standpoint, OK to discharge to home when medically cleared by Hospitalist Service. Patient to follow up in our clinic in 2 weeks, our office to arrange appointment. Does need to continue aspirin and ticagrelor from our standpoint. Calling in 1 month supply to [employer] in [place], will consider switch to clopidogrel at f/u appt for remainder of 6 month dual antiplatelet course.     Post-angioseal instructions: no lifting greater than 5 lbs and no baths/ swimming/ soaking in tub for 5 days. Shower OK. OK to change dressings to band aid as needed. Resume activity as usual after 5 days.

## 2020-01-28 NOTE — CARE PLAN
Problem: Safety  Goal: Will remain free from falls  Note:   Safety interventions in place to prevent falls such as bed locked and in lowest position, Bed alarm on, and call light within reach.      Problem: Infection  Goal: Will remain free from infection  Note:   Universal precautions in place such as hand hygiene

## 2020-01-28 NOTE — CARE PLAN
Problem: Pain Management  Goal: Pain level will decrease to patient's comfort goal  Intervention: Follow pain managment plan developed in collaboration with patient and Interdisciplinary Team  Note:   Patient continually assessed and reassessed for pain at appropriate intervals using scale appropriate for mental status (0-10). Medicated per MAR after updating MD on pain level and location and obtaining orders for Rx.      Problem: Discharge Barriers/Planning  Goal: Patient's continuum of care needs will be met  Intervention: Involve patient and significant other/support system in setting and prioritizing goals for hospital stay and discharge  Note:   Patient assessed for medical knowledge and level of comfort with plan of care and rationale for interventions. Discussed potential barriers to transfer being   Hemodynamic instability/continued or worsened bleeding at groin site   Frequency of neurologic examinations  Potential for changes in neurologic status

## 2020-01-28 NOTE — ASSESSMENT & PLAN NOTE
Clinically doing well now without significant issues of anxiety  Cautious use of benzos if absolutely necessary, patient uses Xanax apparently

## 2020-02-11 ASSESSMENT — LIFESTYLE VARIABLES: SUBSTANCE_ABUSE: 0

## 2020-02-11 ASSESSMENT — ENCOUNTER SYMPTOMS
WEIGHT LOSS: 0
HEADACHES: 0
BLOOD IN STOOL: 0
CHILLS: 0
CONSTITUTIONAL NEGATIVE: 1
FOCAL WEAKNESS: 0
SPEECH CHANGE: 0
SENSORY CHANGE: 0
FEVER: 0
MEMORY LOSS: 1
WEAKNESS: 0
DIAPHORESIS: 0
HEARTBURN: 0

## 2020-02-13 ENCOUNTER — HOSPITAL ENCOUNTER (OUTPATIENT)
Dept: RADIOLOGY | Facility: MEDICAL CENTER | Age: 65
End: 2020-02-13
Attending: NURSE PRACTITIONER
Payer: MEDICARE

## 2020-02-13 DIAGNOSIS — I72.9 ANEURYSM (HCC): ICD-10-CM

## 2020-02-13 ASSESSMENT — ENCOUNTER SYMPTOMS
BRUISES/BLEEDS EASILY: 1
NERVOUS/ANXIOUS: 0

## 2021-05-10 ENCOUNTER — PRE-ADMISSION TESTING (OUTPATIENT)
Dept: ADMISSIONS | Facility: MEDICAL CENTER | Age: 66
End: 2021-05-10
Attending: RADIOLOGY
Payer: MEDICARE

## 2021-05-10 DIAGNOSIS — Z01.812 PRE-OPERATIVE LABORATORY EXAMINATION: ICD-10-CM

## 2021-05-10 RX ORDER — LORAZEPAM 1 MG/1
1 TABLET ORAL EVERY 4 HOURS PRN
COMMUNITY

## 2021-05-21 ENCOUNTER — PRE-ADMISSION TESTING (OUTPATIENT)
Dept: ADMISSIONS | Facility: MEDICAL CENTER | Age: 66
End: 2021-05-21
Attending: RADIOLOGY
Payer: MEDICARE

## 2021-05-21 DIAGNOSIS — Z01.812 PRE-OPERATIVE LABORATORY EXAMINATION: ICD-10-CM

## 2021-05-21 LAB
CREAT SERPL-MCNC: 1 MG/DL (ref 0.5–1.4)
ERYTHROCYTE [DISTWIDTH] IN BLOOD BY AUTOMATED COUNT: 44.9 FL (ref 35.9–50)
HCT VFR BLD AUTO: 41.6 % (ref 37–47)
HGB BLD-MCNC: 13.6 G/DL (ref 12–16)
INR PPP: 0.92 (ref 0.87–1.13)
MCH RBC QN AUTO: 30.4 PG (ref 27–33)
MCHC RBC AUTO-ENTMCNC: 32.7 G/DL (ref 33.6–35)
MCV RBC AUTO: 92.9 FL (ref 81.4–97.8)
PLATELET # BLD AUTO: 283 K/UL (ref 164–446)
PMV BLD AUTO: 10 FL (ref 9–12.9)
PROTHROMBIN TIME: 12.6 SEC (ref 12–14.6)
RBC # BLD AUTO: 4.48 M/UL (ref 4.2–5.4)
WBC # BLD AUTO: 6.3 K/UL (ref 4.8–10.8)

## 2021-05-21 PROCEDURE — 36415 COLL VENOUS BLD VENIPUNCTURE: CPT

## 2021-05-21 PROCEDURE — 85027 COMPLETE CBC AUTOMATED: CPT

## 2021-05-21 PROCEDURE — 85610 PROTHROMBIN TIME: CPT

## 2021-05-21 PROCEDURE — 82565 ASSAY OF CREATININE: CPT

## 2021-05-26 ENCOUNTER — HOSPITAL ENCOUNTER (OUTPATIENT)
Facility: MEDICAL CENTER | Age: 66
End: 2021-05-26
Attending: RADIOLOGY | Admitting: RADIOLOGY
Payer: MEDICARE

## 2021-05-26 ENCOUNTER — APPOINTMENT (OUTPATIENT)
Dept: RADIOLOGY | Facility: MEDICAL CENTER | Age: 66
End: 2021-05-26
Attending: RADIOLOGY
Payer: MEDICARE

## 2021-05-26 VITALS
TEMPERATURE: 97.4 F | BODY MASS INDEX: 20.45 KG/M2 | SYSTOLIC BLOOD PRESSURE: 104 MMHG | DIASTOLIC BLOOD PRESSURE: 65 MMHG | HEIGHT: 68 IN | HEART RATE: 68 BPM | WEIGHT: 134.92 LBS | RESPIRATION RATE: 18 BRPM | OXYGEN SATURATION: 95 %

## 2021-05-26 DIAGNOSIS — I67.1 INTRACRANIAL ANEURYSM: ICD-10-CM

## 2021-05-26 PROCEDURE — 700111 HCHG RX REV CODE 636 W/ 250 OVERRIDE (IP)

## 2021-05-26 PROCEDURE — A9270 NON-COVERED ITEM OR SERVICE: HCPCS | Performed by: RADIOLOGY

## 2021-05-26 PROCEDURE — 700111 HCHG RX REV CODE 636 W/ 250 OVERRIDE (IP): Performed by: RADIOLOGY

## 2021-05-26 PROCEDURE — 99153 MOD SED SAME PHYS/QHP EA: CPT

## 2021-05-26 PROCEDURE — 160002 HCHG RECOVERY MINUTES (STAT)

## 2021-05-26 PROCEDURE — 700117 HCHG RX CONTRAST REV CODE 255: Performed by: RADIOLOGY

## 2021-05-26 PROCEDURE — 700105 HCHG RX REV CODE 258: Performed by: RADIOLOGY

## 2021-05-26 PROCEDURE — 700101 HCHG RX REV CODE 250: Performed by: RADIOLOGY

## 2021-05-26 PROCEDURE — 700102 HCHG RX REV CODE 250 W/ 637 OVERRIDE(OP): Performed by: RADIOLOGY

## 2021-05-26 RX ORDER — HYDROCODONE BITARTRATE AND ACETAMINOPHEN 10; 325 MG/1; MG/1
1 TABLET ORAL 2 TIMES DAILY PRN
COMMUNITY
Start: 2021-05-20

## 2021-05-26 RX ORDER — OXYCODONE HYDROCHLORIDE 5 MG/1
5 TABLET ORAL
Status: DISCONTINUED | OUTPATIENT
Start: 2021-05-26 | End: 2021-05-26 | Stop reason: HOSPADM

## 2021-05-26 RX ORDER — ALPRAZOLAM 1 MG/1
1 TABLET ORAL NIGHTLY PRN
COMMUNITY

## 2021-05-26 RX ORDER — LIDOCAINE HYDROCHLORIDE 10 MG/ML
INJECTION, SOLUTION EPIDURAL; INFILTRATION; INTRACAUDAL; PERINEURAL
Status: DISCONTINUED
Start: 2021-05-26 | End: 2021-05-26 | Stop reason: HOSPADM

## 2021-05-26 RX ORDER — ONDANSETRON 2 MG/ML
4 INJECTION INTRAMUSCULAR; INTRAVENOUS PRN
Status: ACTIVE | OUTPATIENT
Start: 2021-05-26 | End: 2021-05-26

## 2021-05-26 RX ORDER — SODIUM CHLORIDE 9 MG/ML
INJECTION, SOLUTION INTRAVENOUS ONCE
Status: COMPLETED | OUTPATIENT
Start: 2021-05-26 | End: 2021-05-26

## 2021-05-26 RX ORDER — SODIUM CHLORIDE 9 MG/ML
500 INJECTION, SOLUTION INTRAVENOUS
Status: DISPENSED | OUTPATIENT
Start: 2021-05-26 | End: 2021-05-26

## 2021-05-26 RX ORDER — ALPRAZOLAM 0.25 MG/1
1 TABLET ORAL
Status: COMPLETED | OUTPATIENT
Start: 2021-05-26 | End: 2021-05-26

## 2021-05-26 RX ORDER — ONDANSETRON 2 MG/ML
4 INJECTION INTRAMUSCULAR; INTRAVENOUS ONCE
Status: COMPLETED | OUTPATIENT
Start: 2021-05-26 | End: 2021-05-26

## 2021-05-26 RX ORDER — OXYCODONE HYDROCHLORIDE 5 MG/1
2.5 TABLET ORAL
Status: DISCONTINUED | OUTPATIENT
Start: 2021-05-26 | End: 2021-05-26 | Stop reason: HOSPADM

## 2021-05-26 RX ORDER — MIDAZOLAM HYDROCHLORIDE 1 MG/ML
INJECTION INTRAMUSCULAR; INTRAVENOUS
Status: COMPLETED
Start: 2021-05-26 | End: 2021-05-26

## 2021-05-26 RX ORDER — MIDAZOLAM HYDROCHLORIDE 1 MG/ML
.5-2 INJECTION INTRAMUSCULAR; INTRAVENOUS PRN
Status: DISCONTINUED | OUTPATIENT
Start: 2021-05-26 | End: 2021-05-26

## 2021-05-26 RX ORDER — HYDROMORPHONE HYDROCHLORIDE 1 MG/ML
0.25 INJECTION, SOLUTION INTRAMUSCULAR; INTRAVENOUS; SUBCUTANEOUS
Status: DISCONTINUED | OUTPATIENT
Start: 2021-05-26 | End: 2021-05-26 | Stop reason: HOSPADM

## 2021-05-26 RX ORDER — AMOXICILLIN 500 MG/1
500 CAPSULE ORAL 3 TIMES DAILY
COMMUNITY
Start: 2021-05-20

## 2021-05-26 RX ADMIN — ONDANSETRON 4 MG: 2 INJECTION INTRAMUSCULAR; INTRAVENOUS at 12:37

## 2021-05-26 RX ADMIN — FENTANYL CITRATE 25 MCG: 50 INJECTION, SOLUTION INTRAMUSCULAR; INTRAVENOUS at 11:38

## 2021-05-26 RX ADMIN — MIDAZOLAM HYDROCHLORIDE 1 MG: 1 INJECTION, SOLUTION INTRAMUSCULAR; INTRAVENOUS at 11:21

## 2021-05-26 RX ADMIN — FENTANYL CITRATE 25 MCG: 50 INJECTION, SOLUTION INTRAMUSCULAR; INTRAVENOUS at 11:25

## 2021-05-26 RX ADMIN — OXYCODONE HYDROCHLORIDE 5 MG: 5 TABLET ORAL at 12:39

## 2021-05-26 RX ADMIN — IOHEXOL 65 ML: 300 INJECTION, SOLUTION INTRAVENOUS at 12:10

## 2021-05-26 RX ADMIN — LIDOCAINE HYDROCHLORIDE 0.5 ML: 10 INJECTION, SOLUTION EPIDURAL; INFILTRATION; INTRACAUDAL at 10:37

## 2021-05-26 RX ADMIN — MIDAZOLAM HYDROCHLORIDE 1 MG: 1 INJECTION INTRAMUSCULAR; INTRAVENOUS at 11:21

## 2021-05-26 RX ADMIN — ALPRAZOLAM 1 MG: 0.25 TABLET ORAL at 10:35

## 2021-05-26 RX ADMIN — FENTANYL CITRATE 50 MCG: 50 INJECTION, SOLUTION INTRAMUSCULAR; INTRAVENOUS at 11:56

## 2021-05-26 RX ADMIN — SODIUM CHLORIDE 500 ML: 9 INJECTION, SOLUTION INTRAVENOUS at 10:52

## 2021-05-26 ASSESSMENT — PAIN DESCRIPTION - PAIN TYPE
TYPE: ACUTE PAIN;SURGICAL PAIN
TYPE: ACUTE PAIN
TYPE: ACUTE PAIN;SURGICAL PAIN

## 2021-05-26 ASSESSMENT — FIBROSIS 4 INDEX: FIB4 SCORE: 1.35

## 2021-05-26 NOTE — OR SURGEON
Immediate Post- Operative Note        PostOp Diagnosis:S/P endovascular repair of ACOM aneurym      Procedure(s): Diagnostic cerebral angiogram      Estimated Blood Loss: Less than 5 ml        Complications: None            5/26/2021     12:17 PM     Efrain Dudley M.D.

## 2021-05-26 NOTE — OR NURSING
1340 - Received pt from Recovery. No complaints of pain or N/V at this time. VSS. Dressing to R groin CDI and soft to palpate. 2+ pedal pulse felt. No other complaints from patient at this time. Pt to be on strict bed rest until 1430.    1347 - Pt  Bill back with in room with patient.     1438 - Pt off of strict bed rest at this time. Pt ambulated to bathroom and voided adequately. Pt  helping pt get dressed. R groin site still CDI no signs of bleeding    1453 - Discharge orders received. IV taken out. All belongings returned to pt. Pt changed into clothing with assistance. Pt up and ambulated to BR and voided adequately. Discharge instructions given and discussed as well as pain management handout. Pt verbalizes understandings and all questions answered at this time. Pt states they are ready to be D/C home. Pt D/C via wheelchair with all belongings with RN

## 2021-05-26 NOTE — OR NURSING
Assumed care of patient. Alert and oriented times four. Educated patient about plan of care for procedure. See flow sheets for vital signs. Assessment completed. Patient safety measures in place, call light within reach, non skid socks in use, patient educated to call for help before getting up. Hourly rounding in place.     Patient is here in preop for IR procedure. She states that she was assured by two RN's that she would have an order for xanax before her IV placement due to a terrible experience at her last procedure where she was pocked 6 times. This RN contacted IR and spoke with charge ANN Bowman asking for order and she has stated that Dr. Dudley will put the order in. Awaiting medication order to give to patient.    1000: Called IR charge ANN Bowman once again, asking for medication and she stated that she will remind MD to put the order in asap. Updated patient and family of plan.    1030: Received order for xanax. PIV completed with Ultrasound.

## 2021-05-26 NOTE — PROGRESS NOTES
IR Nursing Note:    Patient underwent a diagnostic angiogram by Dr. Dudley. Procedure site was marked by MD and verified using imaging guidance.  Patient was placed in a supine position. Pre-procedure patient was AAOx4, FERRARI, no acute distress. pedal pulse +2.  Right femoral artery was accessed.  Vitals were taken every 5 minutes and remained stable during procedure (see doc flow sheet for results).  CO2 waveform capnography was monitored and remained 30-38 throughout procedure.  An angio-seal was used to achieved hemostasis.  A Tegaderm and gauze dressing was placed over surgical site. Report called to Maribel JUAREZ. Pt transported by emmy with RN to [facility] pacu.       Post procedure patient FERRARI equally, +2 pedal pulse.  Dr. Dudley updated  via phone.    Terumo Angio-seal 6F Ref # [id number] Lot # [id number]

## 2021-05-26 NOTE — H&P
"History and Physical    Date: 5/26/2021    PCP: Nai Ocasio M.D.      HPI: This is a 65 y.o. female who is s/p ACOM aneurym.    Past Medical History:   Diagnosis Date   • Anxiety    • Arthritis     osteo, fingers   • Blood clotting disorder (HCC)     Took Brilinta for 6 months, no longer taking   • Depression    • Hepatitis C 2000    reprts recieved treatment... \"cured\"   • High cholesterol    • Intracranial aneurysm    • Restless leg syndrome        Past Surgical History:   Procedure Laterality Date   • BOWEL RESECTION  2000    Resection, Bowel   • PLASTIC SURGERY      \"face lift\"       Current Facility-Administered Medications   Medication Dose Route Frequency Provider Last Rate Last Admin   • lidocaine (XYLOCAINE) 1 % injection 0.5 mL  0.5 mL Intradermal Once PRN Efrain Dudley M.D.       • NS infusion   Intravenous Once Efrain Dudley M.D.       • LIDOCAINE HCL (PF) 1 % INJ SOLN            • ALPRAZolam (XANAX) tablet 1 mg  1 mg Oral Pre-Op Once Efrain Dudley M.D.            Social History     Socioeconomic History   • Marital status:      Spouse name: Not on file   • Number of children: Not on file   • Years of education: Not on file   • Highest education level: Not on file   Occupational History   • Not on file   Tobacco Use   • Smoking status: Never Smoker   • Smokeless tobacco: Never Used   Vaping Use   • Vaping Use: Never used   Substance and Sexual Activity   • Alcohol use: Not Currently     Comment: Sober 25 years   • Drug use: Yes     Types: Inhaled     Comment: Marijuana  for sleep   • Sexual activity: Not on file   Other Topics Concern   • Not on file   Social History Narrative   • Not on file     Social Determinants of Health     Financial Resource Strain:    • Difficulty of Paying Living Expenses:    Food Insecurity:    • Worried About Running Out of Food in the Last Year:    • Ran Out of Food in the Last Year:    Transportation Needs:    • Lack of Transportation (Medical):  "   • Lack of Transportation (Non-Medical):    Physical Activity:    • Days of Exercise per Week:    • Minutes of Exercise per Session:    Stress:    • Feeling of Stress :    Social Connections:    • Frequency of Communication with Friends and Family:    • Frequency of Social Gatherings with Friends and Family:    • Attends [employer] Services:    • Active Member of Clubs or Organizations:    • Attends Club or Organization Meetings:    • Marital Status:    Intimate Partner Violence:    • Fear of Current or Ex-Partner:    • Emotionally Abused:    • Physically Abused:    • Sexually Abused:        Family History   Problem Relation Age of Onset   • Hypertension Father        Allergies:  Seasonal    Review of Systems:  H/O aneurym    Physical Exam:    Alert and oriented     No gross neuro deficits  Vital Signs  Blood Pressure : 108/67   Temperature: 36.4 °C (97.6 °F)   Pulse: 74   Respiration: 18   Pulse Oximetry: 96 %        Labs:                    Radiology:  IR-CAROTID-CEREBRAL BILATERAL    (Results Pending)             Assessment and Plan:This is a 65 y.o.s/p ACOM repair    Plan:Diagnostic angiogram

## 2021-05-26 NOTE — OR NURSING
Pt on strict bedrest/flat and straight for two (2) hours post procedure.  Two hours will be complete at 1430.  Pt on 0.5 L NC at this time while on bedrest.   Tolerating PO fluids and medication. Right groin site CDI, soft.  Right pedal pulse 2+.  Pain tolerable per pt, 3/10.  VSS, afebrile, FERRARI, A/O x4.    No belongings in PACU.  Transferred with surgical mask in place.   Oxygen tank 75% full.

## 2021-05-26 NOTE — DISCHARGE INSTRUCTIONS
ACTIVITY: Rest and take it easy for the first 24 hours.  A responsible adult is recommended to remain with you during that time.  It is normal to feel sleepy.  We encourage you to not do anything that requires balance, judgment or coordination.    MILD FLU-LIKE SYMPTOMS ARE NORMAL. YOU MAY EXPERIENCE GENERALIZED MUSCLE ACHES, THROAT IRRITATION, HEADACHE AND/OR SOME NAUSEA.    FOR 24 HOURS DO NOT:  Drive, operate machinery or run household appliances.  Drink beer or alcoholic beverages.   Make important decisions or sign legal documents.    SPECIAL INSTRUCTIONS:   Cerebral Angiogram, Care After  This sheet gives you information about how to care for yourself after your procedure. Your health care provider may also give you more specific instructions. If you have problems or questions, contact your health care provider.  What can I expect after the procedure?  After your procedure, it is common to have:  · Bruising and tenderness at the catheter insertion site.  · A mild headache.  Follow these instructions at home:  Insertion site care    · Follow instructions from your health care provider about how to take care of the insertion site. Make sure you:  ? Wash your hands with soap and water before you change your bandage (dressing). If soap and water are not available, use hand .  ? Change your dressing as told by your health care provider.  ? Leave stitches (sutures), skin glue, or adhesive strips in place. These skin closures may need to stay in place for 2 weeks or longer. If adhesive strip edges start to loosen and curl up, you may trim the loose edges. Do not remove adhesive strips completely unless your health care provider tells you to do that.  · Do not apply powder or lotion to the site.  · Do not take baths, swim, or use a hot tub until your health care provider says it is okay to do so.  · You may shower 24-48 hours after the procedure or as told by your health care provider.  ? Gently wash the  site with plain soap and water.  ? Pat the area dry with a clean towel.  ? Do not rub the site. This may cause bleeding.  · Check your incision area every day for signs of infection. Check for:  ? Redness, swelling, or pain.  ? Fluid or blood.  ? Warmth.  ? Pus or a bad smell.  Activity  · Rest as told by your health care provider.  · Do not lift anything that is heavier than 10 lb (4.5 kg), or the limit that your health care provider tells you, until he or she says that it is safe.  · Do not drive for 24 hours if you were given a medicine to help you relax (sedative).  · Do not drive or use heavy machinery while taking prescription pain medicine.  General instructions  · Return to your normal activities as told by your health care provider. Ask your health care provider what activities are safe for you.  · If the catheter site starts to bleed, lie flat and put pressure on the site.  · Drink enough fluid to keep your urine clear or pale yellow. This helps flush the contrast dye from your body.  · Take over-the-counter and prescription medicines only as told by your health care provider.  · Keep all follow-up visits as directed by your health care provider. This is important.  Contact a health care provider if:  · You have a fever or chills.  · You have redness, swelling, or more pain around your insertion site.  · You have fluid or blood coming from your insertion site.  · The insertion site feels warm to the touch.  · You have pus or a bad smell coming from your insertion site.  · You have more bruising around the insertion site.  · Blood collects in the tissue around the catheter site (hematoma), and the hematoma may be painful to the touch.  Get help right away if:  · You have vision changes or loss of vision.  · The catheter insertion area swells very fast.  · You have numbness or weakness on one side of your body.  · You have trouble talking, or you have slurred speech or cannot speak (aphasia).  · You feel  confused or have trouble remembering.  · You have severe pain at the catheter insertion area.  · The catheter insertion area is bleeding, and the bleeding does not stop after 30 minutes of holding steady pressure on the site.  · The arm or leg where the catheter was inserted is numb, tingling, or cold.  · You have chest pain.  · You have trouble breathing.  · You have a rash.  · You have trouble using the arm or leg where the catheter was inserted.  These symptoms may represent a serious problem that is an emergency. Do not wait to see if the symptoms will go away. Get medical help right away. Call your local emergency services (911 in [place] Do not drive yourself to the hospital.  Summary  · After your procedure, it is common to have bruising and tenderness at the site where the catheter was inserted.  · If your catheter insertion site begins to bleed, put direct pressure on it until the bleeding stops.  · After your procedure, it is important to rest and drink plenty of fluids.  · Return to your normal activities as told by your health care provider. Ask your health care provider what activities are safe for you.  This information is not intended to replace advice given to you by your health care provider. Make sure you discuss any questions you have with your health care provider.  Document Released: 05/04/2015 Document Revised: 11/30/2018 Document Reviewed: 01/22/2018  [employer] Patient Education © 2020 [employer]    DIET: To avoid nausea, slowly advance diet as tolerated, avoiding spicy or greasy foods for the first day.  Add more substantial food to your diet according to your physician's instructions. INCREASE FLUIDS AND FIBER TO AVOID CONSTIPATION.    FOLLOW-UP APPOINTMENT:  A follow-up appointment should be arranged with your doctor; call to schedule.    You should CALL YOUR PHYSICIAN if you develop:  Fever greater than 101 degrees F.  Pain not relieved by medication, or persistent nausea or  vomiting.  Excessive bleeding (blood soaking through dressing) or unexpected drainage from the wound.  Extreme redness or swelling around the incision site, drainage of pus or foul smelling drainage.  Inability to urinate or empty your bladder within 8 hours.  Problems with breathing or chest pain.    You should call 911 if you develop problems with breathing or chest pain.  If you are unable to contact your doctor or surgical center, you should go to the nearest emergency room or urgent care center.  Physician's telephone #: Dr. Dudley [phone].    If any questions arise, call your doctor.  If your doctor is not available, please feel free to call the Surgical Center at [phone]. The Contact Center is open Monday through Friday 7AM to 5PM and may speak to a nurse at [phone], or toll free at [phone].     A registered nurse may call you a few days after your surgery to see how you are doing after your procedure.    MEDICATIONS: Resume taking daily medication.  Take prescribed pain medication with food.  If no medication is prescribed, you may take non-aspirin pain medication if needed.  PAIN MEDICATION CAN BE VERY CONSTIPATING.  Take a stool softener or laxative such as senokot, pericolace, or milk of magnesia if needed.    Prescription given for ***.  Last pain medication given at 5mg Oxycodone given at 12:39pm.    If your physician has prescribed pain medication that includes Acetaminophen (Tylenol), do not take additional Acetaminophen (Tylenol) while taking the prescribed medication.                                                                     Depression / Suicide Risk    As you are discharged from this [facility] facility, it is important to learn how to keep safe from harming yourself.    Recognize the warning signs:  · Abrupt changes in personality, positive or negative- including increase in energy   · Giving away possessions  · Change in eating patterns- significant weight  changes-  positive or negative  · Change in sleeping patterns- unable to sleep or sleeping all the time   · Unwillingness or inability to communicate  · Depression  · Unusual sadness, discouragement and loneliness  · Talk of wanting to die  · Neglect of personal appearance   · Rebelliousness- reckless behavior  · Withdrawal from people/activities they love  · Confusion- inability to concentrate     If you or a loved one observes any of these behaviors or has concerns about self-harm, here's what you can do:  · Talk about it- your feelings and reasons for harming yourself  · Remove any means that you might use to hurt yourself (examples: pills, rope, extension cords, firearm)  · Get professional help from the community (Mental Health, Substance Abuse, psychological counseling)  · Do not be alone:Call your Safe Contact- someone whom you trust who will be there for you.  · Call your local CRISIS HOTLINE 752-6984 or 115-877-7413  · Call your local Children's Mobile Crisis Response Team [place] (866) 106-1919 or www.[employer]  · Call the toll free National Suicide Prevention Hotlines   · National Suicide Prevention Lifeline 461-050-EUGN (1083)  · National Hope Line Network 800-SUICIDE (266-2318)

## 2021-05-26 NOTE — OR NURSING
Report received from Tammy JUAREZ in IR.  Pt arrived to [facility] on RA.  VSS, afebrile, FERRARI, A/O x4.  Flat on gurney.  Right groin CDI, soft.  Right pedal pulse 2+.  No belongings with pt.

## 2022-06-10 ENCOUNTER — APPOINTMENT (OUTPATIENT)
Dept: RADIOLOGY | Facility: MEDICAL CENTER | Age: 67
End: 2022-06-10
Attending: NURSE PRACTITIONER
Payer: MEDICARE

## 2022-07-11 ENCOUNTER — APPOINTMENT (OUTPATIENT)
Dept: RADIOLOGY | Facility: MEDICAL CENTER | Age: 67
End: 2022-07-11
Attending: NURSE PRACTITIONER
Payer: MEDICARE

## 2022-07-13 ENCOUNTER — HOSPITAL ENCOUNTER (OUTPATIENT)
Dept: RADIOLOGY | Facility: MEDICAL CENTER | Age: 67
End: 2022-07-13
Attending: NURSE PRACTITIONER
Payer: MEDICARE

## 2022-07-13 DIAGNOSIS — I67.1 INTRACRANIAL ANEURYSM: ICD-10-CM

## 2022-07-13 PROCEDURE — A9270 NON-COVERED ITEM OR SERVICE: HCPCS | Performed by: RADIOLOGY

## 2022-07-13 PROCEDURE — 70544 MR ANGIOGRAPHY HEAD W/O DYE: CPT | Mod: MB

## 2022-07-13 PROCEDURE — 700102 HCHG RX REV CODE 250 W/ 637 OVERRIDE(OP): Performed by: RADIOLOGY

## 2022-07-13 RX ORDER — ALPRAZOLAM 1 MG/1
1 TABLET ORAL
Status: COMPLETED | OUTPATIENT
Start: 2022-07-13 | End: 2022-07-13

## 2022-07-13 RX ADMIN — ALPRAZOLAM 1 MG: 1 TABLET ORAL at 13:30

## 2022-07-13 NOTE — DISCHARGE INSTRUCTIONS
MRI ADULT DISCHARGE INSTRUCTIONS    You have been medicated today for your scan. Please follow the instructions below to ensure your safe recovery. If you have any questions or problems, feel free to call us at [phone] or [phone].     1.   Have someone stay with you to assist you as needed.    2.   Do not drive or operate any mechanical devices.    3.   Do not perform any activity that requires concentration. Make no major decisions over the next 24 hours.     4.   Be careful changing positions from laying down to sitting or standing, as you may become dizzy.     5.   Do not drink alcohol for 48 hours.    6.   There are no restrictions for eating your normal meals. Drink fluids.    7.   You may continue your usual medications for pain, tranquilizers, muscle relaxants or sedatives when awake.     8.   Tomorrow, you may continue your normal daily activities.     9.   Pressure dressing on 10 - 15 minutes. If swelling or bleeding occurs when removed, continue placing direct pressure on injection site for another 5 minutes, or until bleeding stops.     Alprazolam (XANAX) tablet  What is this medicine?  You were prescribed ALPRAZOLAM (al PRAY [place]ado) for the procedure you had today. This medication is a benzodiazepine. It is used to treat anxiety and panic attacks.  This medicine may be used for other purposes; ask your health care provider or pharmacist if you have questions.  What side effects may I notice from receiving this medicine?  Side effects that you should report to your doctor or health care professional as soon as possible:  allergic reactions like skin rash, itching or hives, swelling of the face, lips, or tongue  confusion, forgetfulness  depression  difficulty sleeping  difficulty speaking  feeling faint or lightheaded, falls  mood changes, excitability or aggressive behavior  muscle cramps  trouble passing urine or change in the amount of urine  unusually weak or tired  Side effects that usually do not  require medical attention (report to your doctor or health care professional if they continue or are bothersome):  changes in appetite  change in sex drive or performance  This list may not describe all possible side effects. Call your doctor for medical advice about side effects. You may report side effects to FDA at 0-233-HUL-2842.       I have been informed of and understand the above discharge instructions.

## 2022-07-14 NOTE — PROGRESS NOTES
S/p ACOM aneurysm repair with WEB and LVIS stent. MRA reviewed with Dr. Dudley: no evidence of recurrence. Next MRA in 1 year.     Call placed to pt, who is relieved by the findings. Agrees to MRA in 1 year.
